# Patient Record
Sex: FEMALE | Race: WHITE | NOT HISPANIC OR LATINO | Employment: OTHER | URBAN - METROPOLITAN AREA
[De-identification: names, ages, dates, MRNs, and addresses within clinical notes are randomized per-mention and may not be internally consistent; named-entity substitution may affect disease eponyms.]

---

## 2021-12-20 ENCOUNTER — OFFICE VISIT (OUTPATIENT)
Dept: INTERNAL MEDICINE | Facility: CLINIC | Age: 66
End: 2021-12-20

## 2021-12-20 VITALS
HEIGHT: 67 IN | OXYGEN SATURATION: 99 % | BODY MASS INDEX: 28.88 KG/M2 | SYSTOLIC BLOOD PRESSURE: 112 MMHG | WEIGHT: 184 LBS | TEMPERATURE: 98.2 F | DIASTOLIC BLOOD PRESSURE: 60 MMHG | RESPIRATION RATE: 18 BRPM | HEART RATE: 69 BPM

## 2021-12-20 DIAGNOSIS — G35 MULTIPLE SCLEROSIS (HCC): ICD-10-CM

## 2021-12-20 DIAGNOSIS — E55.9 VITAMIN D DEFICIENCY: ICD-10-CM

## 2021-12-20 DIAGNOSIS — E53.8 VITAMIN B 12 DEFICIENCY: ICD-10-CM

## 2021-12-20 DIAGNOSIS — Z12.31 ENCOUNTER FOR SCREENING MAMMOGRAM FOR MALIGNANT NEOPLASM OF BREAST: ICD-10-CM

## 2021-12-20 DIAGNOSIS — Z01.89 ROUTINE LAB DRAW: ICD-10-CM

## 2021-12-20 DIAGNOSIS — R53.83 FATIGUE, UNSPECIFIED TYPE: ICD-10-CM

## 2021-12-20 DIAGNOSIS — R56.9 FOCAL SEIZURES (HCC): ICD-10-CM

## 2021-12-20 DIAGNOSIS — N95.1 POST MENOPAUSAL SYNDROME: ICD-10-CM

## 2021-12-20 DIAGNOSIS — E78.2 MIXED HYPERLIPIDEMIA: ICD-10-CM

## 2021-12-20 DIAGNOSIS — Z76.89 ENCOUNTER TO ESTABLISH CARE: Primary | ICD-10-CM

## 2021-12-20 DIAGNOSIS — D50.9 IRON DEFICIENCY ANEMIA, UNSPECIFIED IRON DEFICIENCY ANEMIA TYPE: ICD-10-CM

## 2021-12-20 PROCEDURE — 99204 OFFICE O/P NEW MOD 45 MIN: CPT

## 2021-12-20 RX ORDER — BACLOFEN 10 MG/1
10 TABLET ORAL 2 TIMES DAILY
COMMUNITY
End: 2022-03-21 | Stop reason: SDUPTHER

## 2021-12-20 RX ORDER — PRAVASTATIN SODIUM 10 MG
10 TABLET ORAL DAILY
COMMUNITY
End: 2021-12-20 | Stop reason: SDUPTHER

## 2021-12-20 RX ORDER — PRAVASTATIN SODIUM 10 MG
10 TABLET ORAL DAILY
Qty: 90 TABLET | Refills: 3 | Status: SHIPPED | OUTPATIENT
Start: 2021-12-20 | End: 2022-01-06

## 2021-12-20 RX ORDER — CHLORZOXAZONE 500 MG/1
500 TABLET ORAL
COMMUNITY
End: 2022-03-21 | Stop reason: SDUPTHER

## 2021-12-20 RX ORDER — GABAPENTIN 300 MG/1
300 CAPSULE ORAL 2 TIMES DAILY
COMMUNITY
End: 2022-03-21 | Stop reason: SDUPTHER

## 2021-12-20 RX ORDER — MULTIPLE VITAMINS W/ MINERALS TAB 9MG-400MCG
1 TAB ORAL DAILY
COMMUNITY

## 2021-12-20 RX ORDER — LANOLIN ALCOHOL/MO/W.PET/CERES
1000 CREAM (GRAM) TOPICAL DAILY
COMMUNITY

## 2021-12-20 RX ORDER — MELATONIN
2000 DAILY
COMMUNITY

## 2021-12-20 RX ORDER — CLONAZEPAM 0.5 MG/1
0.12 TABLET ORAL DAILY
COMMUNITY
End: 2022-08-09 | Stop reason: SDUPTHER

## 2021-12-20 NOTE — ASSESSMENT & PLAN NOTE
Patient has focal seizures.  She states that her neurologist diagnosed her with these.  She takes clonazepam 0.5 mg at bedtime.  She states that her symptoms are managed.  Plan: Continue visits with neurology and clonazepam.

## 2021-12-20 NOTE — ASSESSMENT & PLAN NOTE
Patient has a history of Vit D deficiency. She is taking vit D supplementation 2000 units daily.  Plan: Check vit D level. Continue vit d.

## 2021-12-20 NOTE — PROGRESS NOTES
"Chief Complaint  Establish Care (previous PCP [facility]. Fax:[phone])    Subjective          History of Present Illness  Tania Payne presents to [facility] INTER[facility] MED[facility]INE  To establish care. Last PCP [place], last appt in July.   She does have multiple sclerosis (diagnosed in 2018), hyperlipidemia, vitamin B 12 def, vit d def, focal seizures and anxiety. She reports a history of bone marrow dysfunction. She states that it has resolved. She states she had a complete workup including biopsies, all negative for malignancy. She no longer sees hematology.    She states that she just established with Dr. Miranda for management of MS. Managed by neuro.    She is active. Prior to MS, she exercised frequently, walking and going to the gym.    Hx of focal seizures. She takes clonazepam for that at bedtime. Managed by neuro.     Mammo-she states that each time she has one, she has to have follow up imaging/biopsies.  Dexa-we will order  Colon-2 years ago, benign  Pap-No longer wants to get  Flu vaccine-refused  PNA vaccine-refused  Shingles vaccine-recommended  COVID-has had both, plans to get booster.    Denies CP, SOA, palpitations, issues with bowel or blader    Objective   Vital Signs:   /60 (BP Location: Left arm, Patient Position: Sitting, Cuff Size: Large Adult)   Pulse 69   Temp 98.2 °F (36.8 °C) (Temporal)   Resp 18   Ht 170.2 cm (67\")   Wt 83.5 kg (184 lb)   SpO2 99%   BMI 28.82 kg/m²     Physical Exam  Constitutional:       Appearance: Normal appearance.   HENT:      Head: Normocephalic and atraumatic.   Eyes:      Extraocular Movements: Extraocular movements intact.      Conjunctiva/sclera: Conjunctivae normal.   Cardiovascular:      Rate and Rhythm: Normal rate and regular rhythm.      Pulses: Normal pulses.      Heart sounds: Normal heart sounds.   Pulmonary:      Effort: Pulmonary effort is normal. No respiratory distress.      Breath sounds: " Normal breath sounds. No stridor. No wheezing, rhonchi or rales.   Abdominal:      General: Bowel sounds are normal.      Palpations: Abdomen is soft.   Musculoskeletal:         General: Normal range of motion.      Cervical back: Normal range of motion.      Right lower leg: No edema.      Left lower leg: No edema.   Skin:     General: Skin is warm and dry.   Neurological:      Mental Status: She is alert and oriented to person, place, and time.   Psychiatric:         Mood and Affect: Mood normal.         Behavior: Behavior normal.         Thought Content: Thought content normal.         Judgment: Judgment normal.        Result Review :                 Assessment and Plan    Diagnoses and all orders for this visit:    1. Encounter to establish care (Primary)    2. Vitamin D deficiency  Assessment & Plan:  Patient has a history of Vit D deficiency. She is taking vit D supplementation 2000 units daily.  Plan: Check vit D level. Continue vit d.     Orders:  -     Vitamin D 25 Hydroxy; Future    3. Fatigue, unspecified type  -     TSH+Free T4; Future    4. Multiple sclerosis (HCC)  Assessment & Plan:  Patient was diagnosed multiple sclerosis in 2018.  She has already became established here in [place] with Dr. Miranda for management of that.  Neurology has been managing her medications including the gabapentin, clonazepam, Parafon, and baclofen.  Plan: Continue visits with neurology and current medication regimen.      5. Focal seizures (HCC)  Assessment & Plan:  Patient has focal seizures.  She states that her neurologist diagnosed her with these.  She takes clonazepam 0.5 mg at bedtime.  She states that her symptoms are managed.  Plan: Continue visits with neurology and clonazepam.       6. Vitamin B 12 deficiency  -     Vitamin B12 & Folate; Future    7. Mixed hyperlipidemia  Assessment & Plan:  Patient is currently taking pravastatin 10 mg qhs. Tolerates well.  Plan: Continue pravastatin and check lipid panel.      Orders:  -     CBC & Differential; Future  -     Comprehensive Metabolic Panel; Future  -     Lipid Panel; Future    8. Iron deficiency anemia, unspecified iron deficiency anemia type  Assessment & Plan:  Patient states that she has a history of bone marrow dysfunction. She did have issues with her iron levels as well. She states that those have resolved themselves. She saw hematologist and had work up in the past.  Check iron profile and cbc.    Orders:  -     Iron Profile; Future    9. Routine lab draw  -     TSH+Free T4; Future    10. Post menopausal syndrome  -     DEXA Bone Density Axial    11. Encounter for screening mammogram for malignant neoplasm of breast  -     Mammo Screening Bilateral With CAD; Future    Other orders  -     pravastatin (PRAVACHOL) 10 MG tablet; Take 1 tablet by mouth Daily.  Dispense: 90 tablet; Refill: 3      Follow Up {Instructions Charge Capture  Follow-up Communications :23}  Return for Medicare Wellness.  Patient was given instructions and counseling regarding her condition or for health maintenance advice. Please see specific information pulled into the AVS if appropriate.

## 2021-12-20 NOTE — ASSESSMENT & PLAN NOTE
Patient states that she has a history of bone marrow dysfunction. She did have issues with her iron levels as well. She states that those have resolved themselves. She saw hematologist and had work up in the past.  Check iron profile and cbc.

## 2021-12-20 NOTE — ASSESSMENT & PLAN NOTE
Patient is currently taking pravastatin 10 mg qhs. Tolerates well.  Plan: Continue pravastatin and check lipid panel.

## 2021-12-20 NOTE — ASSESSMENT & PLAN NOTE
Patient was diagnosed multiple sclerosis in 2018.  She has already became established here in [place] with Dr. Miranda for management of that.  Neurology has been managing her medications including the gabapentin, clonazepam, Parafon, and baclofen.  Plan: Continue visits with neurology and current medication regimen.

## 2021-12-22 ENCOUNTER — PATIENT ROUNDING (BHMG ONLY) (OUTPATIENT)
Dept: INTERNAL MEDICINE | Facility: CLINIC | Age: 66
End: 2021-12-22

## 2021-12-22 NOTE — PROGRESS NOTES
December 22, 2021    Hello, may I speak with Tania Payne?    My name is Loan Mcgarry       I am  with [facility] INTER[facility] MEDICINE  [address] [address].    Before we get started may I verify your date of birth? 1955    I am calling to officially welcome you to our practice and ask about your recent visit. Is this a good time to talk?had to leave voicemail    Tell me about your visit with us. What things went well? had to leave voicemail, said to call back if they have any questions or concerns       We're always looking for ways to make our patients' experiences even better. Do you have recommendations on ways we may improve?had to leave voicemail    Overall were you satisfied with your first visit to our practice?had to leave voicemail       I appreciate you taking the time to speak with me today. Is there anything else I can do for you? had to leave voicemail    Thank you, and have a great day.

## 2021-12-28 ENCOUNTER — LAB (OUTPATIENT)
Dept: LAB | Facility: HOSPITAL | Age: 66
End: 2021-12-28

## 2021-12-28 DIAGNOSIS — E53.8 VITAMIN B 12 DEFICIENCY: ICD-10-CM

## 2021-12-28 DIAGNOSIS — E55.9 VITAMIN D DEFICIENCY: ICD-10-CM

## 2021-12-28 DIAGNOSIS — D50.9 IRON DEFICIENCY ANEMIA, UNSPECIFIED IRON DEFICIENCY ANEMIA TYPE: ICD-10-CM

## 2021-12-28 DIAGNOSIS — R53.83 FATIGUE, UNSPECIFIED TYPE: ICD-10-CM

## 2021-12-28 DIAGNOSIS — E78.2 MIXED HYPERLIPIDEMIA: ICD-10-CM

## 2021-12-28 DIAGNOSIS — Z01.89 ROUTINE LAB DRAW: ICD-10-CM

## 2021-12-28 LAB
25(OH)D3 SERPL-MCNC: 34.4 NG/ML
ALBUMIN SERPL-MCNC: 4.3 G/DL (ref 3.5–5.2)
ALBUMIN/GLOB SERPL: 1.5 G/DL
ALP SERPL-CCNC: 72 U/L (ref 39–117)
ALT SERPL W P-5'-P-CCNC: 49 U/L (ref 1–33)
ANION GAP SERPL CALCULATED.3IONS-SCNC: 7.5 MMOL/L (ref 5–15)
AST SERPL-CCNC: 32 U/L (ref 1–32)
BASOPHILS # BLD AUTO: 0.02 10*3/MM3 (ref 0–0.2)
BASOPHILS NFR BLD AUTO: 0.3 % (ref 0–1.5)
BILIRUB SERPL-MCNC: 0.4 MG/DL (ref 0–1.2)
BUN SERPL-MCNC: 16 MG/DL (ref 8–23)
BUN/CREAT SERPL: 19 (ref 7–25)
CALCIUM SPEC-SCNC: 9.8 MG/DL (ref 8.6–10.5)
CHLORIDE SERPL-SCNC: 107 MMOL/L (ref 98–107)
CHOLEST SERPL-MCNC: 238 MG/DL (ref 0–200)
CO2 SERPL-SCNC: 26.5 MMOL/L (ref 22–29)
CREAT SERPL-MCNC: 0.84 MG/DL (ref 0.57–1)
DEPRECATED RDW RBC AUTO: 43.3 FL (ref 37–54)
EOSINOPHIL # BLD AUTO: 0.21 10*3/MM3 (ref 0–0.4)
EOSINOPHIL NFR BLD AUTO: 3.5 % (ref 0.3–6.2)
ERYTHROCYTE [DISTWIDTH] IN BLOOD BY AUTOMATED COUNT: 12.5 % (ref 12.3–15.4)
FOLATE SERPL-MCNC: >20 NG/ML (ref 4.78–24.2)
GFR SERPL CREATININE-BSD FRML MDRD: 68 ML/MIN/1.73
GLOBULIN UR ELPH-MCNC: 2.8 GM/DL
GLUCOSE SERPL-MCNC: 88 MG/DL (ref 65–99)
HCT VFR BLD AUTO: 42.1 % (ref 34–46.6)
HDLC SERPL-MCNC: 37 MG/DL (ref 40–60)
HGB BLD-MCNC: 14.3 G/DL (ref 12–15.9)
IMM GRANULOCYTES # BLD AUTO: 0.01 10*3/MM3 (ref 0–0.05)
IMM GRANULOCYTES NFR BLD AUTO: 0.2 % (ref 0–0.5)
IRON 24H UR-MRATE: 127 MCG/DL (ref 37–145)
IRON SATN MFR SERPL: 41 % (ref 20–50)
LDLC SERPL CALC-MCNC: 140 MG/DL (ref 0–100)
LDLC/HDLC SERPL: 3.64 {RATIO}
LYMPHOCYTES # BLD AUTO: 2.46 10*3/MM3 (ref 0.7–3.1)
LYMPHOCYTES NFR BLD AUTO: 41.3 % (ref 19.6–45.3)
MCH RBC QN AUTO: 32 PG (ref 26.6–33)
MCHC RBC AUTO-ENTMCNC: 34 G/DL (ref 31.5–35.7)
MCV RBC AUTO: 94.2 FL (ref 79–97)
MONOCYTES # BLD AUTO: 0.46 10*3/MM3 (ref 0.1–0.9)
MONOCYTES NFR BLD AUTO: 7.7 % (ref 5–12)
NEUTROPHILS NFR BLD AUTO: 2.8 10*3/MM3 (ref 1.7–7)
NEUTROPHILS NFR BLD AUTO: 47 % (ref 42.7–76)
PLATELET # BLD AUTO: 222 10*3/MM3 (ref 140–450)
PMV BLD AUTO: 8.8 FL (ref 6–12)
POTASSIUM SERPL-SCNC: 4.2 MMOL/L (ref 3.5–5.2)
PROT SERPL-MCNC: 7.1 G/DL (ref 6–8.5)
RBC # BLD AUTO: 4.47 10*6/MM3 (ref 3.77–5.28)
SODIUM SERPL-SCNC: 141 MMOL/L (ref 136–145)
T4 FREE SERPL-MCNC: 0.81 NG/DL (ref 0.93–1.7)
TIBC SERPL-MCNC: 310 MCG/DL (ref 298–536)
TRANSFERRIN SERPL-MCNC: 208 MG/DL (ref 200–360)
TRIGL SERPL-MCNC: 332 MG/DL (ref 0–150)
TSH SERPL DL<=0.05 MIU/L-ACNC: 3.46 UIU/ML (ref 0.27–4.2)
VIT B12 BLD-MCNC: 629 PG/ML (ref 211–946)
VLDLC SERPL-MCNC: 61 MG/DL (ref 5–40)
WBC NRBC COR # BLD: 5.96 10*3/MM3 (ref 3.4–10.8)

## 2021-12-28 PROCEDURE — 83540 ASSAY OF IRON: CPT

## 2021-12-28 PROCEDURE — 80053 COMPREHEN METABOLIC PANEL: CPT

## 2021-12-28 PROCEDURE — 82306 VITAMIN D 25 HYDROXY: CPT

## 2021-12-28 PROCEDURE — 84439 ASSAY OF FREE THYROXINE: CPT

## 2021-12-28 PROCEDURE — 85025 COMPLETE CBC W/AUTO DIFF WBC: CPT

## 2021-12-28 PROCEDURE — 82607 VITAMIN B-12: CPT

## 2021-12-28 PROCEDURE — 36415 COLL VENOUS BLD VENIPUNCTURE: CPT

## 2021-12-28 PROCEDURE — 84466 ASSAY OF TRANSFERRIN: CPT

## 2021-12-28 PROCEDURE — 80061 LIPID PANEL: CPT

## 2021-12-28 PROCEDURE — 84443 ASSAY THYROID STIM HORMONE: CPT

## 2021-12-28 PROCEDURE — 82746 ASSAY OF FOLIC ACID SERUM: CPT

## 2022-01-06 DIAGNOSIS — E78.2 MIXED HYPERLIPIDEMIA: Primary | ICD-10-CM

## 2022-01-06 DIAGNOSIS — E55.9 VITAMIN D DEFICIENCY: ICD-10-CM

## 2022-01-06 RX ORDER — PRAVASTATIN SODIUM 20 MG
20 TABLET ORAL DAILY
Qty: 90 TABLET | Refills: 1 | Status: SHIPPED | OUTPATIENT
Start: 2022-01-06 | End: 2022-07-06 | Stop reason: SDUPTHER

## 2022-01-12 ENCOUNTER — TELEPHONE (OUTPATIENT)
Dept: INTERNAL MEDICINE | Facility: CLINIC | Age: 67
End: 2022-01-12

## 2022-01-12 DIAGNOSIS — G35 MULTIPLE SCLEROSIS: Primary | ICD-10-CM

## 2022-01-12 DIAGNOSIS — R56.9 FOCAL SEIZURES: ICD-10-CM

## 2022-01-12 NOTE — TELEPHONE ENCOUNTER
Called patient to let her know of her husbands positive COVID test results. Patient  is to stop taking prednisone and start new medication that Shanta has sent in to pharmacy. Pharmacy called to say that they do not have 6 mg tabs but they do have 4 mg tabs and so he will need to take 1.5 tabs a day to make 6 mg. Total of 15 tabs.    Also wanted to know how patient  was feeling? Has he gotten any better? Is she experiencing any symptoms? We have put in an order for her to get a COVID test done because she is also a patient of Shanta Hannah. Patient can go to the same place her  got tested.

## 2022-01-12 NOTE — TELEPHONE ENCOUNTER
Patient is requesting referral to neurology. I have pended order if you would like to sign. Or if you cant from home this is one I can sign with verbal.    Wanting referral for her multiple sclerosis and focal seizures. Let me know and I will respond to her [employer] message.

## 2022-02-08 ENCOUNTER — HOSPITAL ENCOUNTER (OUTPATIENT)
Dept: MAMMOGRAPHY | Facility: HOSPITAL | Age: 67
Discharge: HOME | End: 2022-02-08

## 2022-02-08 ENCOUNTER — HOSPITAL ENCOUNTER (OUTPATIENT)
Dept: BONE DENSITY | Facility: HOSPITAL | Age: 67
Discharge: HOME | End: 2022-02-08

## 2022-02-08 DIAGNOSIS — Z12.31 ENCOUNTER FOR SCREENING MAMMOGRAM FOR MALIGNANT NEOPLASM OF BREAST: ICD-10-CM

## 2022-02-08 PROCEDURE — 77080 DXA BONE DENSITY AXIAL: CPT

## 2022-02-08 PROCEDURE — 77063 BREAST TOMOSYNTHESIS BI: CPT

## 2022-02-08 PROCEDURE — 77067 SCR MAMMO BI INCL CAD: CPT

## 2022-02-22 DIAGNOSIS — N60.01 BREAST CYST, RIGHT: ICD-10-CM

## 2022-02-22 DIAGNOSIS — N63.0 BREAST NODULE: ICD-10-CM

## 2022-02-22 DIAGNOSIS — R92.8 ABNORMAL MAMMOGRAM: Primary | ICD-10-CM

## 2022-03-01 ENCOUNTER — OFFICE VISIT (OUTPATIENT)
Dept: NEUROLOGY | Facility: CLINIC | Age: 67
End: 2022-03-01

## 2022-03-01 VITALS
HEART RATE: 70 BPM | SYSTOLIC BLOOD PRESSURE: 129 MMHG | BODY MASS INDEX: 29.03 KG/M2 | WEIGHT: 185 LBS | DIASTOLIC BLOOD PRESSURE: 71 MMHG | HEIGHT: 67 IN

## 2022-03-01 DIAGNOSIS — G51.32 HEMIFACIAL SPASM OF LEFT SIDE OF FACE: ICD-10-CM

## 2022-03-01 DIAGNOSIS — G93.9 CHRONIC NON-SPECIFIC WHITE MATTER LESIONS ON MRI: ICD-10-CM

## 2022-03-01 DIAGNOSIS — G89.4 CHRONIC PAIN SYNDROME: Primary | ICD-10-CM

## 2022-03-01 DIAGNOSIS — G43.009 MIGRAINE WITHOUT AURA AND WITHOUT STATUS MIGRAINOSUS, NOT INTRACTABLE: ICD-10-CM

## 2022-03-01 PROCEDURE — 99205 OFFICE O/P NEW HI 60 MIN: CPT | Performed by: PSYCHIATRY & NEUROLOGY

## 2022-03-01 NOTE — ASSESSMENT & PLAN NOTE
Her main complaint is diffuse pain.  She also has fatigue.  I discussed with her that I do not think she has multiple sclerosis.  She does not have the clinical symptomatology and MRI findings suggestive of relapsing-remitting multiple sclerosis.  Lumbar puncture was negative for oligoclonal bands.    I discussed with her that I can initiate a work-up however I may not be able to give her a diagnosis.  I would recommend for her to go to the [facility] to get an opinion since she has seen 3 neurologists.    I discussed with her that the [facility] may do a rheumatologic work-up, neurologic work-up and may give her a diagnosis of fibromyalgia syndrome.  She states that she does not like that diagnosis.  I discussed with her that leaving up to the [facility] to give her a specific diagnosis.    I would recommend for her primary care provider to prescribe to her clonazepam, gabapentin, baclofen until she has been seen by the [facility].  She states that it controls her symptoms.

## 2022-03-01 NOTE — ASSESSMENT & PLAN NOTE
I discussed with her that she probably is hemifacial spasms in the left side of her face which the treatment for that is Botox injections.  I discussed with her that clonazepam is not usually effective for this.

## 2022-03-01 NOTE — ASSESSMENT & PLAN NOTE
She has a history of migraine headaches and I believe the white matter changes seen on MRI are secondary to migraine headaches and not multiple sclerosis.

## 2022-03-01 NOTE — ASSESSMENT & PLAN NOTE
MRI findings are unlikely secondary to vasculitis.  It is most likely related to migraine headaches.

## 2022-03-01 NOTE — PROGRESS NOTES
Chief Complaint  Multiple Sclerosis    Subjective          Tania Payne is a 66 y.o. female who presents to [facility] NEUROLOGY & NEUROSURGERY  History of Present Illness  66-year-old woman evaluated for 20 to 25-year history of pain, multiple sclerosis, facial spasms.  She was seen by a doctor in [place] and was diagnosed to have multiple sclerosis.  She took Tysabri which gave her bone marrow suppression.  She went to [facility] and they could not tell her why she had bone marrow suppression.  It has resolved.  She states that 20 to 25 years ago she had flexor spasms in her right shoulder, right knee, back and all over her body.  She states that 2 weeks in 2016 she felt weak all over in her arms and legs that lasted for 1 month.  She was 50% affected.  Her speech was okay.  She had difficulty with cognitive issues since that time.  She states that it is hard for her to think at times.  She states that she confuses her recipes.  She has to go back and look over again since her short-term memory is affected.  She is independent with all activities of daily living.  She has no problems with getting lost, driving, going to the grocery stores, financial problems.  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  She is a physical therapist.    She states that her sleep is fine.  She wakes up feeling refreshed.  She does not have any excessive daytime somnolence.  She states that she is not depressed.  There is no history of depression.    She has spasms in the left side of her face occurring 2 times a month lasting for 2 to 3 days.  It happens several times a day of her left eye predominantly having spasms as well as her left face and sometimes her mouth.  This is been ongoing for the last 2 to 3 years.  She states that her previous neurologist diagnosed her to have seizures and treated her for  "such.    She was diagnosed to have multiple sclerosis as well based on white matter changes in the MRI.  The last time she had a lumbar puncture in 2019 oligoclonal bands were negative.  I reviewed the MRI of the brain and these are nonspecific white matter changes.  They are not periventricular in location and does not meet the Cavazos's criteria.  There are no enhancing lesions.  She has had several MRIs of the brain which are unchanged.  She has never had any focal neurologic deficits.  She is never had strokelike symptoms.  She has a history of migraine headaches since she was 19 years old.  She would have the headaches at least 1-2 times a month lasting for 2 to 3 days where she had a severe headache associated light noise sensitivity, nausea.  She states that the migraine started getting better in menopause when she was about 57 years old.  She still getting headaches occasionally but not as severe as she is to have in the past.    Objective   Vital Signs:   /71   Pulse 70   Ht 170.2 cm (67.01\")   Wt 83.9 kg (185 lb)   BMI 28.97 kg/m²     Physical Exam   She is alert, fluent, phasic, follows commands well.  Optic disc are normal bilaterally visual fields full confrontation, EOMs are full all directions gaze, facial strength is full, soft palate elevation and tongue are normal.  There is no weakness of the upper or lower extremities individual muscle testing.  Fine finger movements are intact.  Reflexes are normoactive symmetrical in biceps, triceps, patellar's and ankles.  Cerebellar testing is intact finger-to-nose, rapid movements and fine finger movements.  Toe tapping is intact.  Station gait she is able to tiptoe, heel walk, son and tandem without difficulty.  Heart is regular and rhythm normal in rate.        Assessment and Plan  Diagnoses and all orders for this visit:    1. Chronic pain syndrome (Primary)  Assessment & Plan:  Her main complaint is diffuse pain.  She also has fatigue.  I " discussed with her that I do not think she has multiple sclerosis.  She does not have the clinical symptomatology and MRI findings suggestive of relapsing-remitting multiple sclerosis.  Lumbar puncture was negative for oligoclonal bands.    I discussed with her that I can initiate a work-up however I may not be able to give her a diagnosis.  I would recommend for her to go to the [facility] to get an opinion since she has seen 3 neurologists.    I discussed with her that the [facility] may do a rheumatologic work-up, neurologic work-up and may give her a diagnosis of fibromyalgia syndrome.  She states that she does not like that diagnosis.  I discussed with her that leaving up to the [facility] to give her a specific diagnosis.    I would recommend for her primary care provider to prescribe to her clonazepam, gabapentin, baclofen until she has been seen by the [facility].  She states that it controls her symptoms.        2. 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  I discussed with her that clonazepam is not usually effective for this.      3. Migraine without aura and without status migrainosus, not intractable  Assessment & Plan:  She has a history of migraine headaches and I believe the white matter changes seen on MRI are secondary to migraine headaches and not multiple sclerosis.        4. Chronic non-specific white matter lesions on MRI  Assessment & Plan:  MRI findings are unlikely secondary to vasculitis.  It is most likely related to migraine headaches.         Total time spent with the patient and coordinating patient care was 60 minutes.    Follow Up  No follow-ups on file.  Patient was given instructions and counseling regarding her condition or for health maintenance advice. Please see specific information pulled into the AVS if appropriate.

## 2022-03-04 ENCOUNTER — PATIENT ROUNDING (BHMG ONLY) (OUTPATIENT)
Dept: NEUROSURGERY | Facility: CLINIC | Age: 67
End: 2022-03-04

## 2022-03-04 NOTE — PROGRESS NOTES
Ld, My name is Lisa     I am  The Practice Manager with [facility] NEUROSURGERY [facility] NEUROLOGY & NEUROSURGERY and want to officially welcome you to our practice and ask about your recent visit.    Tell me about your visit with us. What things went well?         We're always looking for ways to make our patients' experiences even better. Do you have recommendations on ways we may improve?      Overall were you satisfied with your first visit to our practice?      I appreciate you taking the time to answer these question. Is there anything else I can do for you?     You may also receive a brief survey via e-mail or mail from [employer] about our patient satisfaction, if you could please take a moment and answer it would be helpful to the practice growth.    Thank you, and have a great day.

## 2022-03-21 ENCOUNTER — HOSPITAL ENCOUNTER (OUTPATIENT)
Dept: MAMMOGRAPHY | Facility: HOSPITAL | Age: 67
Discharge: HOME | End: 2022-03-21

## 2022-03-21 ENCOUNTER — HOSPITAL ENCOUNTER (OUTPATIENT)
Dept: ULTRASOUND IMAGING | Facility: HOSPITAL | Age: 67
Discharge: HOME | End: 2022-03-21

## 2022-03-21 ENCOUNTER — OFFICE VISIT (OUTPATIENT)
Dept: INTERNAL MEDICINE | Facility: CLINIC | Age: 67
End: 2022-03-21

## 2022-03-21 VITALS
BODY MASS INDEX: 29.41 KG/M2 | WEIGHT: 187.4 LBS | HEART RATE: 81 BPM | DIASTOLIC BLOOD PRESSURE: 72 MMHG | SYSTOLIC BLOOD PRESSURE: 110 MMHG | OXYGEN SATURATION: 95 % | TEMPERATURE: 97.8 F | HEIGHT: 67 IN

## 2022-03-21 DIAGNOSIS — G89.4 CHRONIC PAIN SYNDROME: ICD-10-CM

## 2022-03-21 DIAGNOSIS — G35 MULTIPLE SCLEROSIS: Primary | ICD-10-CM

## 2022-03-21 DIAGNOSIS — G93.9 CHRONIC NON-SPECIFIC WHITE MATTER LESIONS ON MRI: ICD-10-CM

## 2022-03-21 DIAGNOSIS — R56.9 FOCAL SEIZURES: ICD-10-CM

## 2022-03-21 PROCEDURE — 77066 DX MAMMO INCL CAD BI: CPT

## 2022-03-21 PROCEDURE — G0279 TOMOSYNTHESIS, MAMMO: HCPCS

## 2022-03-21 PROCEDURE — 99213 OFFICE O/P EST LOW 20 MIN: CPT

## 2022-03-21 PROCEDURE — 76642 ULTRASOUND BREAST LIMITED: CPT

## 2022-03-21 RX ORDER — BACLOFEN 10 MG/1
10 TABLET ORAL 2 TIMES DAILY
Qty: 180 TABLET | Refills: 0 | Status: SHIPPED | OUTPATIENT
Start: 2022-03-21 | End: 2022-06-19 | Stop reason: SDUPTHER

## 2022-03-21 RX ORDER — CLONAZEPAM 0.5 MG/1
0.12 TABLET ORAL DAILY
Qty: 90 TABLET | Refills: 0 | Status: CANCELLED | OUTPATIENT
Start: 2022-03-21

## 2022-03-21 RX ORDER — CHLORZOXAZONE 500 MG/1
500 TABLET ORAL
Qty: 180 TABLET | Refills: 0 | Status: SHIPPED | OUTPATIENT
Start: 2022-03-21 | End: 2022-10-03 | Stop reason: SDUPTHER

## 2022-03-21 NOTE — ASSESSMENT & PLAN NOTE
Patient has been diagnosed with multiple sclerosis years ago.  She recently was seen by Dr. Zhao yet who does not agree that she has MS.  He feels that she needs to go to the [facility] for definitive diagnosis.  Patient is currently on gabapentin, baclofen clonazepam, Parafon and all of those manage her symptoms.  Patient does occasionally have pain, weakness, off balance and confusion episodes.  She does have an appointment tomorrow with the [facility].  Plan: Consult with [facility] tomorrow.  Continue current regimen for now unless otherwise recommended by [facility].

## 2022-03-21 NOTE — PROGRESS NOTES
"Chief Complaint  Med Refill (Gabapentin, baclofen, clonazpam, chlorazoxazone.//Neurology Radha wanted patient to go to [facility] to get a MS Dx because he didn't agree with DX.  believes medication is helping symptoms but wants you to manage them until [place] clinic is done.)    Subjective          History of Present Illness  Tania Payne presents to [facility] INTERNAL MEDICINE  To follow up on recent visit with neurology.  She had been diagnosed with MS years ago.  She was told that she did not have MS by Dr. Garcia so to get a definitive diagnosis, he feels that she should go to the [facility].   She feels like her current medication regimen is working for her.  She has already been to rheumatologists.  She has episodes of weakness, off balance, and confusion.     She has a phone appt with [facility] tomorrow.     Objective   Vital Signs:   /72 (BP Location: Right arm, Patient Position: Sitting, Cuff Size: Adult)   Pulse 81   Temp 97.8 °F (36.6 °C) (Temporal)   Ht 170.2 cm (67.01\")   Wt 85 kg (187 lb 6.4 oz)   SpO2 95%   BMI 29.34 kg/m²     Physical Exam  Constitutional:       Appearance: Normal appearance.   Eyes:      Extraocular Movements: Extraocular movements intact.      Conjunctiva/sclera: Conjunctivae normal.   Cardiovascular:      Rate and Rhythm: Normal rate.      Pulses: Normal pulses.   Pulmonary:      Effort: Pulmonary effort is normal.   Abdominal:      General: Bowel sounds are normal. There is no distension.      Palpations: Abdomen is soft. There is no mass.      Tenderness: There is no abdominal tenderness. There is no right CVA tenderness, left CVA tenderness, guarding or rebound.      Hernia: No hernia is present.   Musculoskeletal:         General: Normal range of motion.      Cervical back: Normal range of motion.   Skin:     General: Skin is warm and dry.   Neurological:      Mental Status: She is alert and oriented to person, place, and time. "   Psychiatric:         Mood and Affect: Mood normal.         Behavior: Behavior normal.         Thought Content: Thought content normal.         Judgment: Judgment normal.        Result Review :                 Assessment and Plan    Diagnoses and all orders for this visit:    1. Multiple sclerosis (HCC) (Primary)  Assessment & Plan:  Patient has been diagnosed with multiple sclerosis years ago.  She recently was seen by Dr. Zhao yet who does not agree that she has MS.  He feels that she needs to go to the [facility] for definitive diagnosis.  Patient is currently on gabapentin, baclofen clonazepam, Parafon and all of those manage her symptoms.  Patient does occasionally have pain, weakness, off balance and confusion episodes.  She does have an appointment tomorrow with the [facility].  Plan: Consult with [facility] tomorrow.  Continue current regimen for now unless otherwise recommended by [facility].    Orders:  -     baclofen (LIORESAL) 10 MG tablet; Take 1 tablet by mouth 2 (Two) Times a Day.  Dispense: 180 tablet; Refill: 0  -     gabapentin (NEURONTIN) 300 MG capsule; Take 1 capsule by mouth 2 (Two) Times a Day.  Dispense: 180 capsule; Refill: 0  -     chlorzoxazone (PARAFON FORTE) 500 MG tablet; Take 1/2 tablet by mouth in the afternoon and 1 tablet at bedtime.  Dispense: 180 tablet; Refill: 0    2. Chronic non-specific white matter lesions on MRI    3. Chronic pain syndrome  -     Urine Drug Screen - Urine, Clean Catch; Future    4. Focal seizures (HCC)  Assessment & Plan:  Managed with clonazepam 0.125 mg daily.  Plan: Continue with clonazepam 0.125. See what [facility] says during consultation.      Orders:  -     clonazePAM (KlonoPIN) 0.125 MG disintegrating tablet; Place 1 tablet on the tongue Daily.  Dispense: 90 tablet; Refill: 0      Follow Up   No follow-ups on file.  Patient was given instructions and counseling regarding her condition or for health maintenance advice. Please see specific  information pulled into the AVS if appropriate.       Answers for HPI/ROS submitted by the patient on 3/15/2022  Please describe your symptoms.: Pain in both hip flexors, both arms and R leg.  Have you had these symptoms before?: Yes  How long have you been having these symptoms?: Greater than 2 weeks  Please list any medications you are currently taking for this condition.: Baclofen, gabapentin, chlorzoxazone.  Please describe any probable cause for these symptoms. : Multiple sclerosis  What is the primary reason for your visit?: Other

## 2022-03-21 NOTE — ASSESSMENT & PLAN NOTE
Managed with clonazepam 0.125 mg daily.  Plan: Continue with clonazepam 0.125. See what [facility] says during consultation.

## 2022-03-25 DIAGNOSIS — G35 MULTIPLE SCLEROSIS: ICD-10-CM

## 2022-03-25 DIAGNOSIS — G89.4 CHRONIC PAIN SYNDROME: Primary | ICD-10-CM

## 2022-03-25 RX ORDER — CLONAZEPAM 0.12 MG/1
0.12 TABLET, ORALLY DISINTEGRATING ORAL DAILY
Qty: 90 TABLET | Refills: 0 | Status: SHIPPED | OUTPATIENT
Start: 2022-03-25 | End: 2022-11-08 | Stop reason: SDUPTHER

## 2022-03-25 RX ORDER — GABAPENTIN 300 MG/1
300 CAPSULE ORAL 2 TIMES DAILY
Qty: 180 CAPSULE | Refills: 0 | Status: SHIPPED | OUTPATIENT
Start: 2022-03-25 | End: 2022-06-19 | Stop reason: SDUPTHER

## 2022-06-08 ENCOUNTER — TELEPHONE (OUTPATIENT)
Dept: INTERNAL MEDICINE | Facility: CLINIC | Age: 67
End: 2022-06-08

## 2022-06-08 NOTE — TELEPHONE ENCOUNTER
Camilla, Her referral to Dr. Jose Pederson has been denied, they state that they do not do second opinions. I didn't know what you thought Shanta would want to do about this. Thanks.

## 2022-06-08 NOTE — TELEPHONE ENCOUNTER
Patient is aware. She is going to see if she can find another office that does theses second opinions. Then let us know. Closing referral.

## 2022-06-19 DIAGNOSIS — G35 MULTIPLE SCLEROSIS: ICD-10-CM

## 2022-06-20 RX ORDER — GABAPENTIN 300 MG/1
300 CAPSULE ORAL 2 TIMES DAILY
Qty: 180 CAPSULE | Refills: 0 | Status: SHIPPED | OUTPATIENT
Start: 2022-06-20 | End: 2022-09-18 | Stop reason: SDUPTHER

## 2022-06-20 RX ORDER — BACLOFEN 10 MG/1
10 TABLET ORAL 2 TIMES DAILY
Qty: 180 TABLET | Refills: 0 | Status: SHIPPED | OUTPATIENT
Start: 2022-06-20 | End: 2022-10-04

## 2022-07-06 RX ORDER — PRAVASTATIN SODIUM 20 MG
20 TABLET ORAL DAILY
Qty: 90 TABLET | Refills: 1 | Status: SHIPPED | OUTPATIENT
Start: 2022-07-06 | End: 2022-08-09 | Stop reason: SDUPTHER

## 2022-08-08 ENCOUNTER — LAB (OUTPATIENT)
Dept: LAB | Facility: HOSPITAL | Age: 67
End: 2022-08-08

## 2022-08-08 DIAGNOSIS — E55.9 VITAMIN D DEFICIENCY: ICD-10-CM

## 2022-08-08 DIAGNOSIS — E78.2 MIXED HYPERLIPIDEMIA: ICD-10-CM

## 2022-08-08 DIAGNOSIS — G89.4 CHRONIC PAIN SYNDROME: ICD-10-CM

## 2022-08-08 LAB
25(OH)D3 SERPL-MCNC: 34.3 NG/ML (ref 30–100)
ALBUMIN SERPL-MCNC: 4.4 G/DL (ref 3.5–5.2)
ALBUMIN/GLOB SERPL: 1.7 G/DL
ALP SERPL-CCNC: 69 U/L (ref 39–117)
ALT SERPL W P-5'-P-CCNC: 43 U/L (ref 1–33)
AMPHET+METHAMPHET UR QL: NEGATIVE
ANION GAP SERPL CALCULATED.3IONS-SCNC: 12.6 MMOL/L (ref 5–15)
AST SERPL-CCNC: 31 U/L (ref 1–32)
BACTERIA UR QL AUTO: NORMAL /HPF
BARBITURATES UR QL SCN: NEGATIVE
BASOPHILS # BLD AUTO: 0.02 10*3/MM3 (ref 0–0.2)
BASOPHILS NFR BLD AUTO: 0.4 % (ref 0–1.5)
BENZODIAZ UR QL SCN: NEGATIVE
BILIRUB SERPL-MCNC: 0.3 MG/DL (ref 0–1.2)
BILIRUB UR QL STRIP: NEGATIVE
BUN SERPL-MCNC: 12 MG/DL (ref 8–23)
BUN/CREAT SERPL: 14.1 (ref 7–25)
CALCIUM SPEC-SCNC: 9 MG/DL (ref 8.6–10.5)
CANNABINOIDS SERPL QL: NEGATIVE
CHLORIDE SERPL-SCNC: 105 MMOL/L (ref 98–107)
CHOLEST SERPL-MCNC: 212 MG/DL (ref 0–200)
CLARITY UR: CLEAR
CO2 SERPL-SCNC: 23.4 MMOL/L (ref 22–29)
COCAINE UR QL: NEGATIVE
COLOR UR: YELLOW
CREAT SERPL-MCNC: 0.85 MG/DL (ref 0.57–1)
DEPRECATED RDW RBC AUTO: 43.1 FL (ref 37–54)
EGFRCR SERPLBLD CKD-EPI 2021: 75.7 ML/MIN/1.73
EOSINOPHIL # BLD AUTO: 0.21 10*3/MM3 (ref 0–0.4)
EOSINOPHIL NFR BLD AUTO: 4 % (ref 0.3–6.2)
ERYTHROCYTE [DISTWIDTH] IN BLOOD BY AUTOMATED COUNT: 12.3 % (ref 12.3–15.4)
GLOBULIN UR ELPH-MCNC: 2.6 GM/DL
GLUCOSE SERPL-MCNC: 91 MG/DL (ref 65–99)
GLUCOSE UR STRIP-MCNC: NEGATIVE MG/DL
HCT VFR BLD AUTO: 42.4 % (ref 34–46.6)
HDLC SERPL-MCNC: 36 MG/DL (ref 40–60)
HGB BLD-MCNC: 14 G/DL (ref 12–15.9)
HGB UR QL STRIP.AUTO: ABNORMAL
IMM GRANULOCYTES # BLD AUTO: 0 10*3/MM3 (ref 0–0.05)
IMM GRANULOCYTES NFR BLD AUTO: 0 % (ref 0–0.5)
KETONES UR QL STRIP: NEGATIVE
LDLC SERPL CALC-MCNC: 119 MG/DL (ref 0–100)
LDLC/HDLC SERPL: 3.09 {RATIO}
LEUKOCYTE ESTERASE UR QL STRIP.AUTO: NEGATIVE
LYMPHOCYTES # BLD AUTO: 1.99 10*3/MM3 (ref 0.7–3.1)
LYMPHOCYTES NFR BLD AUTO: 37.7 % (ref 19.6–45.3)
MCH RBC QN AUTO: 30.8 PG (ref 26.6–33)
MCHC RBC AUTO-ENTMCNC: 33 G/DL (ref 31.5–35.7)
MCV RBC AUTO: 93.4 FL (ref 79–97)
METHADONE UR QL SCN: NEGATIVE
MONOCYTES # BLD AUTO: 0.41 10*3/MM3 (ref 0.1–0.9)
MONOCYTES NFR BLD AUTO: 7.8 % (ref 5–12)
NEUTROPHILS NFR BLD AUTO: 2.65 10*3/MM3 (ref 1.7–7)
NEUTROPHILS NFR BLD AUTO: 50.1 % (ref 42.7–76)
NITRITE UR QL STRIP: NEGATIVE
OPIATES UR QL: NEGATIVE
OXYCODONE UR QL SCN: NEGATIVE
PH UR STRIP.AUTO: 5.5 [PH] (ref 5–8)
PLATELET # BLD AUTO: 205 10*3/MM3 (ref 140–450)
PMV BLD AUTO: 8.7 FL (ref 6–12)
POTASSIUM SERPL-SCNC: 4.3 MMOL/L (ref 3.5–5.2)
PROT SERPL-MCNC: 7 G/DL (ref 6–8.5)
PROT UR QL STRIP: NEGATIVE
RBC # BLD AUTO: 4.54 10*6/MM3 (ref 3.77–5.28)
RBC # UR STRIP: NORMAL /HPF
REF LAB TEST METHOD: NORMAL
SODIUM SERPL-SCNC: 141 MMOL/L (ref 136–145)
SP GR UR STRIP: 1.02 (ref 1–1.03)
SQUAMOUS #/AREA URNS HPF: NORMAL /HPF
T4 FREE SERPL-MCNC: 0.8 NG/DL (ref 0.93–1.7)
TRIGL SERPL-MCNC: 323 MG/DL (ref 0–150)
TSH SERPL DL<=0.05 MIU/L-ACNC: 3.55 UIU/ML (ref 0.27–4.2)
UROBILINOGEN UR QL STRIP: ABNORMAL
VLDLC SERPL-MCNC: 57 MG/DL (ref 5–40)
WBC # UR STRIP: NORMAL /HPF
WBC NRBC COR # BLD: 5.28 10*3/MM3 (ref 3.4–10.8)

## 2022-08-08 PROCEDURE — 82306 VITAMIN D 25 HYDROXY: CPT

## 2022-08-08 PROCEDURE — 80061 LIPID PANEL: CPT

## 2022-08-08 PROCEDURE — 80307 DRUG TEST PRSMV CHEM ANLYZR: CPT

## 2022-08-08 PROCEDURE — 84443 ASSAY THYROID STIM HORMONE: CPT

## 2022-08-08 PROCEDURE — 81001 URINALYSIS AUTO W/SCOPE: CPT

## 2022-08-08 PROCEDURE — 80053 COMPREHEN METABOLIC PANEL: CPT

## 2022-08-08 PROCEDURE — 36415 COLL VENOUS BLD VENIPUNCTURE: CPT

## 2022-08-08 PROCEDURE — 85025 COMPLETE CBC W/AUTO DIFF WBC: CPT

## 2022-08-08 PROCEDURE — 84439 ASSAY OF FREE THYROXINE: CPT

## 2022-08-09 ENCOUNTER — OFFICE VISIT (OUTPATIENT)
Dept: INTERNAL MEDICINE | Facility: CLINIC | Age: 67
End: 2022-08-09

## 2022-08-09 VITALS
WEIGHT: 190.8 LBS | TEMPERATURE: 98.2 F | HEIGHT: 67 IN | HEART RATE: 73 BPM | DIASTOLIC BLOOD PRESSURE: 72 MMHG | OXYGEN SATURATION: 97 % | SYSTOLIC BLOOD PRESSURE: 108 MMHG | BODY MASS INDEX: 29.95 KG/M2 | RESPIRATION RATE: 20 BRPM

## 2022-08-09 DIAGNOSIS — E78.2 MIXED HYPERLIPIDEMIA: ICD-10-CM

## 2022-08-09 DIAGNOSIS — E53.8 VITAMIN B 12 DEFICIENCY: ICD-10-CM

## 2022-08-09 DIAGNOSIS — G35 MULTIPLE SCLEROSIS: ICD-10-CM

## 2022-08-09 DIAGNOSIS — G89.4 CHRONIC PAIN SYNDROME: ICD-10-CM

## 2022-08-09 DIAGNOSIS — R56.9 FOCAL SEIZURES: ICD-10-CM

## 2022-08-09 DIAGNOSIS — R31.9 HEMATURIA, UNSPECIFIED TYPE: ICD-10-CM

## 2022-08-09 DIAGNOSIS — E55.9 VITAMIN D DEFICIENCY: ICD-10-CM

## 2022-08-09 PROCEDURE — 99214 OFFICE O/P EST MOD 30 MIN: CPT

## 2022-08-09 RX ORDER — PRAVASTATIN SODIUM 40 MG
40 TABLET ORAL DAILY
Qty: 90 TABLET | Refills: 1 | Status: SHIPPED | OUTPATIENT
Start: 2022-08-09 | End: 2023-02-01 | Stop reason: SDUPTHER

## 2022-08-09 NOTE — ASSESSMENT & PLAN NOTE
Denies a history of focal seizures.  He takes clonazepam 0.125 mg about 3 times a week.  No seizures.  Plan: Continue current regimen.

## 2022-08-09 NOTE — ASSESSMENT & PLAN NOTE
Patient has multiple sclerosis.  She was diagnosed several years ago.  She does have chronic pain.  She would like a referral to a new MS clinic.  She is taking baclofen in the mornings and gabapentin at night for pain control.  She feels like it is tolerable until she can get in with the MS clinic.  Plan: Continue current regimen.  Referral to MS clinic.

## 2022-08-09 NOTE — ASSESSMENT & PLAN NOTE
Cholesterol triglycerides are still elevated.  She is tolerating pravastatin 20 mg daily.  Plan: Increase pravastatin to 40 mg daily.

## 2022-08-09 NOTE — PROGRESS NOTES
Chief Complaint  Labs Only (Patient is here for a 4 month follow up with labs.) and Referral (Patient needed referral to MS clinic.) Answers for HPI/ROS submitted by the patient on 8/2/2022  What is the primary reason for your visit?: Neurological Problem    SUBJECTIVE  Tania Payne presents to [facility] follow up on MS, hyperlipidemia, vit d deficiency, seizures.       Patient denies any new issues or complaints.  Denies any chest pain, shortness of breath, palpitations, problems with bowel or bladder.      History of Present Illness  Past Medical History:   Diagnosis Date   • Multiple sclerosis (HCC)       Family History   Problem Relation Age of Onset   • Pancreatitis Mother    • Heart disease Father       Past Surgical History:   Procedure Laterality Date   • KNEE ARTHROSCOPY Right 2018   • SHOULDER ARTHROSCOPY Right 1998        Current Outpatient Medications:   •  baclofen (LIORESAL) 10 MG tablet, Take 1 tablet by mouth 2 (Two) Times a Day., Disp: 180 tablet, Rfl: 0  •  chlorzoxazone (PARAFON FORTE) 500 MG tablet, Take 1/2 tablet by mouth in the afternoon and 1 tablet at bedtime., Disp: 180 tablet, Rfl: 0  •  cholecalciferol (VITAMIN D3) 25 MCG (1000 UT) tablet, Take 2,000 Units by mouth Daily., Disp: , Rfl:   •  clonazePAM (KlonoPIN) 0.125 MG disintegrating tablet, Place 1 tablet on the tongue and dissolve daily., Disp: 90 tablet, Rfl: 0  •  gabapentin (NEURONTIN) 300 MG capsule, Take 1 capsule by mouth 2 (Two) Times a Day., Disp: 180 capsule, Rfl: 0  •  multivitamin with minerals tablet tablet, Take 1 tablet by mouth Daily., Disp: , Rfl:   •  pravastatin (PRAVACHOL) 40 MG tablet, Take 1 tablet by mouth Daily., Disp: 90 tablet, Rfl: 1  •  vitamin B-12 (CYANOCOBALAMIN) 1000 MCG tablet, Take 1,000 mcg by mouth Daily., Disp: , Rfl:     OBJECTIVE  Vital Signs:   /72 (BP Location: Left arm, Patient Position: Sitting, Cuff Size: Adult)   Pulse 73   Temp 98.2 °F (36.8  "°C) (Temporal)   Resp 20   Ht 170.2 cm (67.01\")   Wt 86.5 kg (190 lb 12.8 oz)   SpO2 97%   BMI 29.88 kg/m²    Estimated body mass index is 29.88 kg/m² as calculated from the following:    Height as of this encounter: 170.2 cm (67.01\").    Weight as of this encounter: 86.5 kg (190 lb 12.8 oz).     Wt Readings from Last 3 Encounters:   08/09/22 86.5 kg (190 lb 12.8 oz)   03/21/22 85 kg (187 lb 6.4 oz)   03/01/22 83.9 kg (185 lb)     BP Readings from Last 3 Encounters:   08/09/22 108/72   03/21/22 110/72   03/01/22 129/71       Physical Exam  Constitutional:       Appearance: Normal appearance.   Eyes:      Extraocular Movements: Extraocular movements intact.      Conjunctiva/sclera: Conjunctivae normal.   Cardiovascular:      Rate and Rhythm: Normal rate and regular rhythm.      Pulses: Normal pulses.      Heart sounds: Normal heart sounds. No murmur heard.  Pulmonary:      Effort: Pulmonary effort is normal. No respiratory distress.      Breath sounds: Normal breath sounds. No stridor. No wheezing, rhonchi or rales.   Chest:      Chest wall: No tenderness.   Abdominal:      General: Bowel sounds are normal. There is no distension.      Palpations: Abdomen is soft. There is no mass.      Tenderness: There is no abdominal tenderness. There is no right CVA tenderness, left CVA tenderness, guarding or rebound.      Hernia: No hernia is present.   Musculoskeletal:         General: Normal range of motion.      Cervical back: Normal range of motion and neck supple.      Right lower leg: No edema.      Left lower leg: No edema.   Skin:     General: Skin is warm and dry.   Neurological:      Mental Status: She is alert and oriented to person, place, and time.   Psychiatric:         Mood and Affect: Mood normal.         Behavior: Behavior normal.         Thought Content: Thought content normal.         Judgment: Judgment normal.          Result Review    CMP    CMP 12/28/21 8/8/22   Glucose 88 91   BUN 16 12   Creatinine " 0.84 0.85   eGFR Non African Am 68    Sodium 141 141   Potassium 4.2 4.3   Chloride 107 105   Calcium 9.8 9.0   Albumin 4.30 4.40   Total Bilirubin 0.4 0.3   Alkaline Phosphatase 72 69   AST (SGOT) 32 31   ALT (SGPT) 49 (A) 43 (A)   (A) Abnormal value            CBC w/diff    CBC w/Diff 12/28/21 8/8/22   WBC 5.96 5.28   RBC 4.47 4.54   Hemoglobin 14.3 14.0   Hematocrit 42.1 42.4   MCV 94.2 93.4   MCH 32.0 30.8   MCHC 34.0 33.0   RDW 12.5 12.3   Platelets 222 205   Neutrophil Rel % 47.0 50.1   Immature Granulocyte Rel % 0.2 0.0   Lymphocyte Rel % 41.3 37.7   Monocyte Rel % 7.7 7.8   Eosinophil Rel % 3.5 4.0   Basophil Rel % 0.3 0.4           Lipid Panel    Lipid Panel 12/28/21 8/8/22   Total Cholesterol 238 (A) 212 (A)   Triglycerides 332 (A) 323 (A)   HDL Cholesterol 37 (A) 36 (A)   VLDL Cholesterol 61 (A) 57 (A)   LDL Cholesterol  140 (A) 119 (A)   LDL/HDL Ratio 3.64 3.09   (A) Abnormal value            TSH    TSH 12/28/21 8/8/22   TSH 3.460 3.550           Lab Results   Component Value Date    LCNJ16FL 34.3 08/08/2022     Lab Results   Component Value Date    AAWMLIXB98 629 12/28/2021     Lab Results   Component Value Date    RBCUA None Seen 08/08/2022    WBCUA None Seen 08/08/2022    BACTERIA None Seen 08/08/2022    SQUAMEPIUA 0-2 08/08/2022    METHODOLOGY Manual Light Microscopy 08/08/2022    COLORU Yellow 08/08/2022    CLARITYU Clear 08/08/2022    PHUR 5.5 08/08/2022    SPECGRAVUR 1.025 08/08/2022    GLUCOSEU Negative 08/08/2022    KETONESU Negative 08/08/2022    BILIRUBINUR Negative 08/08/2022    BLOODU Trace (A) 08/08/2022    PROTEINUA Negative 08/08/2022    LEUKOCYTESUR Negative 08/08/2022    NITRITEU Negative 08/08/2022    UROBILINOGEN 0.2 E.U./dL 08/08/2022       No Images in the past 120 days found..     The above data has been reviewed by JIM Lino 08/09/2022 11:38 EDT.          Patient Care Team:  Shanta Hannah APRN as PCP - General (Internal Medicine)    BMI is >= 25 and <30. (Overweight) The  following options were offered after discussion;: exercise counseling/recommendations and nutrition counseling/recommendations       ASSESSMENT & PLAN    Diagnoses and all orders for this visit:    1. Mixed hyperlipidemia  Assessment & Plan:  Cholesterol triglycerides are still elevated.  She is tolerating pravastatin 20 mg daily.  Plan: Increase pravastatin to 40 mg daily.    Orders:  -     Comprehensive metabolic panel; Future  -     Lipid panel; Future  -     TSH+Free T4; Future  -     CBC w AUTO Differential; Future  -     Urinalysis With Culture If Indicated -; Future    2. Vitamin D deficiency  Assessment & Plan:  Vitamin D is low normal.  She is taking vitamin D supplementation.  Plan: Continue.    Orders:  -     Vitamin D 25 hydroxy; Future    3. Vitamin B 12 deficiency    4. Multiple sclerosis (HCC)  Assessment & Plan:  Patient has multiple sclerosis.  She was diagnosed several years ago.  She does have chronic pain.  She would like a referral to a new MS clinic.  She is taking baclofen in the mornings and gabapentin at night for pain control.  She feels like it is tolerable until she can get in with the MS clinic.  Plan: Continue current regimen.  Referral to MS clinic.      5. Chronic pain syndrome    6. Hematuria, unspecified type  Assessment & Plan:  Patient denies any dysuria, fever, chills, nausea, vomiting, urgency or frequency.  Her urinalysis did show trace of blood.  Plan: Recheck urinalysis.    Orders:  -     Urinalysis With Culture If Indicated -  -     Urinalysis With Culture If Indicated -; Future    7. Focal seizures (HCC)  Assessment & Plan:  Denies a history of focal seizures.  He takes clonazepam 0.125 mg about 3 times a week.  No seizures.  Plan: Continue current regimen.      Other orders  -     pravastatin (PRAVACHOL) 40 MG tablet; Take 1 tablet by mouth Daily.  Dispense: 90 tablet; Refill: 1       Tobacco Use: Medium Risk   • Smoking Tobacco Use: Former Smoker   • Smokeless Tobacco  Use: Never Used       Follow Up     Return in about 3 months (around 11/9/2022) for Medicare Wellness.      Patient was given instructions and counseling regarding her condition or for health maintenance advice. Please see specific information pulled into the AVS if appropriate.   I have reviewed information obtained and documented by others and I have confirmed the accuracy of this documented note.    Shanta Hannah, APRN

## 2022-08-09 NOTE — ASSESSMENT & PLAN NOTE
Patient denies any dysuria, fever, chills, nausea, vomiting, urgency or frequency.  Her urinalysis did show trace of blood.  Plan: Recheck urinalysis.

## 2022-09-18 DIAGNOSIS — G35 MULTIPLE SCLEROSIS: ICD-10-CM

## 2022-09-19 RX ORDER — GABAPENTIN 300 MG/1
300 CAPSULE ORAL 2 TIMES DAILY
Qty: 180 CAPSULE | Refills: 0 | Status: SHIPPED | OUTPATIENT
Start: 2022-09-19 | End: 2023-01-31 | Stop reason: SDUPTHER

## 2022-10-03 DIAGNOSIS — G35 MULTIPLE SCLEROSIS: ICD-10-CM

## 2022-10-03 RX ORDER — CHLORZOXAZONE 500 MG/1
500 TABLET ORAL
Qty: 180 TABLET | Refills: 0 | Status: SHIPPED | OUTPATIENT
Start: 2022-10-03 | End: 2023-03-08 | Stop reason: SDUPTHER

## 2022-10-04 ENCOUNTER — OFFICE VISIT (OUTPATIENT)
Dept: INTERNAL MEDICINE | Facility: CLINIC | Age: 67
End: 2022-10-04

## 2022-10-04 VITALS
HEIGHT: 67 IN | WEIGHT: 188.8 LBS | TEMPERATURE: 96.9 F | HEART RATE: 59 BPM | OXYGEN SATURATION: 98 % | SYSTOLIC BLOOD PRESSURE: 110 MMHG | DIASTOLIC BLOOD PRESSURE: 70 MMHG | BODY MASS INDEX: 29.63 KG/M2 | RESPIRATION RATE: 18 BRPM

## 2022-10-04 DIAGNOSIS — M79.644 THUMB PAIN, RIGHT: ICD-10-CM

## 2022-10-04 DIAGNOSIS — M65.311 TRIGGER FINGER OF RIGHT THUMB: Primary | ICD-10-CM

## 2022-10-04 PROCEDURE — 99213 OFFICE O/P EST LOW 20 MIN: CPT

## 2022-10-04 NOTE — ASSESSMENT & PLAN NOTE
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  She states it has been persistently painful.  Tender to the touch.  She has been trying to do stretching and applying heat and ice.  She is not getting any improvement.  She is also using a brace.  She also has noticed that it is catching with range of motion.  Plan: Referral to Ortho.

## 2022-10-04 NOTE — PROGRESS NOTES
Answers for HPI/ROS submitted by the patient on 10/2/2022  Please describe your symptoms.: Injured right thumb 3 weeks ago and is not getting better. Its painful.  Have you had these symptoms before?: No  How long have you been having these symptoms?: Greater than 2 weeks  Please list any medications you are currently taking for this condition.: Gabapentin, Pravastatin, Chlorzoxazone, Multi vitamin, Vitamin D3  Please describe any probable cause for these symptoms. : Repetitive injuring moving landscaping stones  What is the primary reason for your visit?: Other    Chief Complaint  Hand Pain (Pt states that her thumb and her palm mostly in her palm she states that it locks up and hurts. She states that this happened about 4 weeks ago.)    GUERRERO Payne presents to [facility] with complaints of right thumb pain. She was doing landscaping and picking up bricks, repetitiely. She woke up and was sore.  She has been applying heat/ice and stretching. No improvement.      History of Present Illness  Past Medical History:   Diagnosis Date   • Multiple sclerosis (HCC)       Family History   Problem Relation Age of Onset   • Pancreatitis Mother    • Heart disease Father       Past Surgical History:   Procedure Laterality Date   • KNEE ARTHROSCOPY Right 2018   • SHOULDER ARTHROSCOPY Right 1998        Current Outpatient Medications:   •  chlorzoxazone (PARAFON FORTE) 500 MG tablet, Take 1/2 tablet by mouth in the afternoon and take 1 tablet by mouth at bedtime, Disp: 180 tablet, Rfl: 0  •  cholecalciferol (VITAMIN D3) 25 MCG (1000 UT) tablet, Take 2,000 Units by mouth Daily., Disp: , Rfl:   •  clonazePAM (KlonoPIN) 0.125 MG disintegrating tablet, Place 1 tablet on the tongue and dissolve daily., Disp: 90 tablet, Rfl: 0  •  gabapentin (NEURONTIN) 300 MG capsule, Take 1 capsule by mouth 2 (Two) Times a Day., Disp: 180 capsule, Rfl: 0  •  multivitamin with minerals tablet tablet,  "Take 1 tablet by mouth Daily., Disp: , Rfl:   •  pravastatin (PRAVACHOL) 40 MG tablet, Take 1 tablet by mouth Daily., Disp: 90 tablet, Rfl: 1  •  vitamin B-12 (CYANOCOBALAMIN) 1000 MCG tablet, Take 1,000 mcg by mouth Daily., Disp: , Rfl:     OBJECTIVE  Vital Signs:   /70 (BP Location: Left arm)   Pulse 59   Temp 96.9 °F (36.1 °C) (Temporal)   Resp 18   Ht 170.2 cm (67.01\")   Wt 85.6 kg (188 lb 12.8 oz)   SpO2 98%   BMI 29.56 kg/m²    Estimated body mass index is 29.56 kg/m² as calculated from the following:    Height as of this encounter: 170.2 cm (67.01\").    Weight as of this encounter: 85.6 kg (188 lb 12.8 oz).     Wt Readings from Last 3 Encounters:   10/04/22 85.6 kg (188 lb 12.8 oz)   08/09/22 86.5 kg (190 lb 12.8 oz)   03/21/22 85 kg (187 lb 6.4 oz)     BP Readings from Last 3 Encounters:   10/04/22 110/70   08/09/22 108/72   03/21/22 110/72       Physical Exam  Constitutional:       Appearance: Normal appearance.   HENT:      Head: Normocephalic and atraumatic.   Eyes:      Extraocular Movements: Extraocular movements intact.      Conjunctiva/sclera: Conjunctivae normal.   Cardiovascular:      Rate and Rhythm: Normal rate.      Pulses: Normal pulses.   Pulmonary:      Effort: Pulmonary effort is normal.      Breath sounds: Normal breath sounds.   Musculoskeletal:      Right hand: Tenderness present. Decreased range of motion (r thumb).      Cervical back: Normal range of motion.   Skin:     General: Skin is warm and dry.   Neurological:      Mental Status: She is alert and oriented to person, place, and time.   Psychiatric:         Mood and Affect: Mood normal.         Behavior: Behavior normal.         Thought Content: Thought content normal.         Judgment: Judgment normal.          Result Review        No Images in the past 120 days found..     The above data has been reviewed by JIM Lino 10/04/2022 10:52 EDT.          Patient Care Team:  Shanta Hannah APRN as PCP - General " (Internal Medicine)  ASSESSMENT & PLAN    Diagnoses and all orders for this visit:    1. 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  She states it has been persistently painful.  Tender to the touch.  She has been trying to do stretching and applying heat and ice.  She is not getting any improvement.  She is also using a brace.  She also has noticed that it is catching with range of motion.  Plan: Referral to Ortho.      2. Thumb pain, right  -     Ambulatory Referral to Orthopedic Surgery       Tobacco Use: Medium Risk   • Smoking Tobacco Use: Former Smoker   • Smokeless Tobacco Use: Never Used       Follow Up     Return for Next scheduled follow up, Medicare Wellness.      Patient was given instructions and counseling regarding her condition or for health maintenance advice. Please see specific information pulled into the AVS if appropriate.   I have reviewed information obtained and documented by others and I have confirmed the accuracy of this documented note.    JIM Lion

## 2022-10-10 ENCOUNTER — OFFICE VISIT (OUTPATIENT)
Dept: ORTHOPEDIC SURGERY | Facility: CLINIC | Age: 67
End: 2022-10-10

## 2022-10-10 VITALS — HEIGHT: 67 IN | BODY MASS INDEX: 29.51 KG/M2 | WEIGHT: 188 LBS

## 2022-10-10 DIAGNOSIS — M65.311 TRIGGER FINGER OF RIGHT THUMB: Primary | ICD-10-CM

## 2022-10-10 PROCEDURE — 99203 OFFICE O/P NEW LOW 30 MIN: CPT | Performed by: ORTHOPAEDIC SURGERY

## 2022-10-10 PROCEDURE — 20550 NJX 1 TENDON SHEATH/LIGAMENT: CPT | Performed by: ORTHOPAEDIC SURGERY

## 2022-10-10 RX ORDER — TRIAMCINOLONE ACETONIDE 40 MG/ML
40 INJECTION, SUSPENSION INTRA-ARTICULAR; INTRAMUSCULAR
Status: COMPLETED | OUTPATIENT
Start: 2022-10-10 | End: 2022-10-10

## 2022-10-10 RX ORDER — LIDOCAINE HYDROCHLORIDE 10 MG/ML
1 INJECTION, SOLUTION INFILTRATION; PERINEURAL
Status: COMPLETED | OUTPATIENT
Start: 2022-10-10 | End: 2022-10-10

## 2022-10-10 RX ADMIN — LIDOCAINE HYDROCHLORIDE 1 ML: 10 INJECTION, SOLUTION INFILTRATION; PERINEURAL at 14:47

## 2022-10-10 RX ADMIN — TRIAMCINOLONE ACETONIDE 40 MG: 40 INJECTION, SUSPENSION INTRA-ARTICULAR; INTRAMUSCULAR at 14:47

## 2022-10-10 NOTE — PROGRESS NOTES
"Chief Complaint  Initial Evaluation of the Right Hand     Subjective      Tania Payne presents to [facility] for an evaluation of right hand. Patient has locking and catching of the right thumb. She states these symptoms have worsened with time. She states her thumb is locked every morning. Locking and catching of the right thumb has been ongoing for 1 month. Patient reports having MS, she does notice early locking and catching symptoms of the middle and ring finger.     Allergies   Allergen Reactions   • Lamictal [Lamotrigine] Rash   • Novocain [Procaine] Rash        Social History     Socioeconomic History   • Marital status:    Tobacco Use   • Smoking status: Former     Packs/day: 0.50     Years: 10.00     Pack years: 5.00     Types: Cigarettes     Start date:      Quit date: 1981     Years since quittin.8   • Smokeless tobacco: Never   Vaping Use   • Vaping Use: Never used   Substance and Sexual Activity   • Alcohol use: Never   • Drug use: Never   • Sexual activity: Defer        Review of Systems     Objective   Vital Signs:   Ht 170.2 cm (67\")   Wt 85.3 kg (188 lb)   BMI 29.44 kg/m²       Physical Exam  Constitutional:       Appearance: Normal appearance. Patient is well-developed and normal weight.   HENT:      Head: Normocephalic.      Right Ear: Hearing and external ear normal.      Left Ear: Hearing and external ear normal.      Nose: Nose normal.   Eyes:      Conjunctiva/sclera: Conjunctivae normal.   Cardiovascular:      Rate and Rhythm: Normal rate.   Pulmonary:      Effort: Pulmonary effort is normal.      Breath sounds: No wheezing or rales.   Abdominal:      Palpations: Abdomen is soft.      Tenderness: There is no abdominal tenderness.   Musculoskeletal:      Cervical back: Normal range of motion.   Skin:     Findings: No rash.   Neurological:      Mental Status: Patient  is alert and oriented to person, place, and time.   Psychiatric:         " Mood and Affect: Mood and affect normal.         Judgment: Judgment normal.       Ortho Exam      RIGHT HAND: Tender A1 pulley. No active triggering in office. Full wrist extension, full wrist flexion, full , full thumb opposition. Full PIP flexors, full DIP flexors, full PIP extensors. Sensation grossly intact. Neurovascular intact.  Negative Tinel's. Negative Finkelstein's. Negative median nerve compression testing. No swelling, skin discoloration or atrophy.     Small Joint Arthrocentesis: Right Trigger Thumb  Consent given by: patient  Site marked: site marked  Timeout: Immediately prior to procedure a time out was called to verify the correct patient, procedure, equipment, support staff and site/side marked as required   Supporting Documentation  Indications: pain   Procedure Details  Location: right trigger thumb.  Preparation: Patient was prepped and draped in the usual sterile fashion  Needle gauge: 23G.  Medications administered: 1 mL lidocaine 1 %; 40 mg triamcinolone acetonide 40 MG/ML  Patient tolerance: patient tolerated the procedure well with no immediate complications              Imaging Results (Most Recent)     None           Result Review :         No results found.          Assessment and Plan     Diagnoses and all orders for this visit:    1. Trigger finger of right thumb (Primary)        Treatment plans consist of trigger thumb release surgery versus trigger thumb injection. She may also monitor her trigger thumb unti lshe is ready for trigger finger release or injection.     Risks, benefits and post-operative care of right trigger thumb release discussed.     Patient understands her options and wishes to proceed with a trigger thumb injection.   Right trigger thumb injection administered, she tolerated this well.     Call or return if worsening symptoms.    Follow Up     PRN.       Patient was given instructions and counseling regarding her condition or for health maintenance advice.  Please see specific information pulled into the AVS if appropriate.     Scribed for Gagan Ballesteros MD by Kae Pruitt.  10/10/22   14:35 EDT    I have personally performed the services described in this document as scribed by the above individual and it is both accurate and complete. Gagan Ballesteros MD 10/10/22

## 2022-11-08 ENCOUNTER — OFFICE VISIT (OUTPATIENT)
Dept: INTERNAL MEDICINE | Facility: CLINIC | Age: 67
End: 2022-11-08

## 2022-11-08 VITALS
HEART RATE: 61 BPM | OXYGEN SATURATION: 97 % | WEIGHT: 187 LBS | BODY MASS INDEX: 29.35 KG/M2 | RESPIRATION RATE: 18 BRPM | DIASTOLIC BLOOD PRESSURE: 86 MMHG | TEMPERATURE: 96.9 F | HEIGHT: 67 IN | SYSTOLIC BLOOD PRESSURE: 131 MMHG

## 2022-11-08 DIAGNOSIS — R56.9 FOCAL SEIZURES: ICD-10-CM

## 2022-11-08 DIAGNOSIS — Z23 NEED FOR INFLUENZA VACCINATION: ICD-10-CM

## 2022-11-08 DIAGNOSIS — Z23 NEED FOR VACCINATION: ICD-10-CM

## 2022-11-08 DIAGNOSIS — Z00.00 WELCOME TO MEDICARE PREVENTIVE VISIT: Primary | ICD-10-CM

## 2022-11-08 DIAGNOSIS — N32.89 BLADDER SPASMS: ICD-10-CM

## 2022-11-08 DIAGNOSIS — Z11.59 NEED FOR HEPATITIS C SCREENING TEST: ICD-10-CM

## 2022-11-08 LAB
BILIRUB BLD-MCNC: NEGATIVE MG/DL
CLARITY, POC: CLEAR
COLOR UR: NORMAL
EXPIRATION DATE: NORMAL
GLUCOSE UR STRIP-MCNC: NEGATIVE MG/DL
KETONES UR QL: NEGATIVE
LEUKOCYTE EST, POC: NEGATIVE
Lab: NORMAL
NITRITE UR-MCNC: NEGATIVE MG/ML
PH UR: 6 [PH] (ref 5–8)
PROT UR STRIP-MCNC: NEGATIVE MG/DL
RBC # UR STRIP: NEGATIVE /UL
SP GR UR: 1.01 (ref 1–1.03)
UROBILINOGEN UR QL: NORMAL

## 2022-11-08 PROCEDURE — 1159F MED LIST DOCD IN RCRD: CPT

## 2022-11-08 PROCEDURE — 1170F FXNL STATUS ASSESSED: CPT

## 2022-11-08 PROCEDURE — G0008 ADMIN INFLUENZA VIRUS VAC: HCPCS

## 2022-11-08 PROCEDURE — G0009 ADMIN PNEUMOCOCCAL VACCINE: HCPCS

## 2022-11-08 PROCEDURE — 90662 IIV NO PRSV INCREASED AG IM: CPT

## 2022-11-08 PROCEDURE — 90677 PCV20 VACCINE IM: CPT

## 2022-11-08 PROCEDURE — 81003 URINALYSIS AUTO W/O SCOPE: CPT

## 2022-11-08 PROCEDURE — G0438 PPPS, INITIAL VISIT: HCPCS

## 2022-11-08 RX ORDER — CLONAZEPAM 0.12 MG/1
0.12 TABLET, ORALLY DISINTEGRATING ORAL DAILY
Qty: 30 TABLET | Refills: 0 | Status: SHIPPED | OUTPATIENT
Start: 2022-11-08 | End: 2023-01-20 | Stop reason: SDUPTHER

## 2022-11-08 NOTE — ASSESSMENT & PLAN NOTE
Age related recommendations discussed with pt.  She does get regular eye/dental exams.  Exercise-she does low impact exercises.

## 2022-11-08 NOTE — PROGRESS NOTES
The ABCs of the Annual Wellness Visit  [place] to [employer] Visit    Chief Complaint   Patient presents with   • Medicare Wellness Visit     Pt states that she is being seen for a Medicare Wellness.      Subjective {   History of Present Illness:  Tania Payne is a 67 y.o. female who presents for a  Welcome to Medicare Visit.    C/o sharp, intermittent pain in the bladder area for 3 weeks. Pt states it went away 3 days ago. Denies dysuria, fever, frequency.    The following portions of the patient's history were reviewed and   updated as appropriate: allergies, current medications, past family history, past medical history, past social history, past surgical history and problem list.     Compared to one year ago, the patient feels her physical   health is better.    Compared to one year ago, the patient feels her mental   health is better.    Recent Hospitalizations:  She was not admitted to the hospital during the last year.       Current Medical Providers:  Patient Care Team:  Shanta Hannah APRN as PCP - General (Internal Medicine)    Outpatient Medications Prior to Visit   Medication Sig Dispense Refill   • chlorzoxazone (PARAFON FORTE) 500 MG tablet Take 1/2 tablet by mouth in the afternoon and take 1 tablet by mouth at bedtime 180 tablet 0   • cholecalciferol (VITAMIN D3) 25 MCG (1000 UT) tablet Take 2,000 Units by mouth Daily.     • gabapentin (NEURONTIN) 300 MG capsule Take 1 capsule by mouth 2 (Two) Times a Day. 180 capsule 0   • multivitamin with minerals tablet tablet Take 1 tablet by mouth Daily.     • pravastatin (PRAVACHOL) 40 MG tablet Take 1 tablet by mouth Daily. 90 tablet 1   • vitamin B-12 (CYANOCOBALAMIN) 1000 MCG tablet Take 1,000 mcg by mouth Daily.     • clonazePAM (KlonoPIN) 0.125 MG disintegrating tablet Place 1 tablet on the tongue and dissolve daily. 90 tablet 0     No facility-administered medications prior to visit.       No opioid medication identified on active medication list. I have  "reviewed chart for other potential  high risk medication/s and harmful drug interactions in the elderly.          Aspirin is not on active medication list.  Aspirin use is not indicated based on review of current medical condition/s. Risk of harm outweighs potential benefits.  .    Patient Active Problem List   Diagnosis   • Vitamin D deficiency   • Vitamin B 12 deficiency   • Mixed hyperlipidemia   • Iron deficiency anemia   • Fatigue   • Multiple sclerosis (HCC)   • Focal seizures (HCC)   • Chronic pain syndrome   • Hemifacial spasm of left side of face   • Migraine without aura and without status migrainosus, not intractable   • Chronic non-specific white matter lesions on MRI   • Hematuria   • Trigger finger of right thumb   • Welcome to Medicare preventive visit     Advance Care Planning  Advance Directive is not on file.  ACP discussion was held with the patient during this visit. Patient has an advance directive (not in EMR), copy requested.          Objective      Vitals:    11/08/22 1112   BP: 131/86   BP Location: Right arm   Pulse: 61   Resp: 18   Temp: 96.9 °F (36.1 °C)   TempSrc: Temporal   SpO2: 97%   Weight: 84.8 kg (187 lb)   Height: 170.2 cm (67.01\")     Estimated body mass index is 29.28 kg/m² as calculated from the following:    Height as of this encounter: 170.2 cm (67.01\").    Weight as of this encounter: 84.8 kg (187 lb).    BMI is >= 25 and <30. (Overweight) The following options were offered after discussion;: exercise counseling/recommendations and nutrition counseling/recommendations      Does the patient have evidence of cognitive impairment? No    Physical Exam  Constitutional:       Appearance: Normal appearance.   HENT:      Head: Normocephalic and atraumatic.   Eyes:      Extraocular Movements: Extraocular movements intact.      Conjunctiva/sclera: Conjunctivae normal.   Cardiovascular:      Rate and Rhythm: Normal rate.   Pulmonary:      Effort: Pulmonary effort is normal. "   Musculoskeletal:         General: Normal range of motion.      Cervical back: Normal range of motion and neck supple.      Right lower leg: No edema.      Left lower leg: No edema.   Skin:     General: Skin is warm and dry.   Neurological:      Mental Status: She is alert and oriented to person, place, and time.   Psychiatric:         Mood and Affect: Mood normal.         Behavior: Behavior normal.         Thought Content: Thought content normal.         Judgment: Judgment normal.              Procedures       HEALTH RISK ASSESSMENT    Smoking Status:  Social History     Tobacco Use   Smoking Status Former   • Packs/day: 0.50   • Years: 10.00   • Pack years: 5.00   • Types: Cigarettes   • Start date:    • Quit date: 1981   • Years since quittin.9   Smokeless Tobacco Never     Alcohol Consumption:  Social History     Substance and Sexual Activity   Alcohol Use Never       Fall Risk Screen:    ROSITA Fall Risk Assessment was completed, and patient is at LOW risk for falls.Assessment completed on:2022    Depression Screen:   PHQ-2/PHQ-9 Depression Screening 2022   Little Interest or Pleasure in Doing Things 0-->not at all   Feeling Down, Depressed or Hopeless 0-->not at all   PHQ-9: Brief Depression Severity Measure Score 0       Health Habits and Functional and Cognitive Screening:  Functional & Cognitive Status 2022   Do you have difficulty preparing food and eating? No   Do you have difficulty bathing yourself, getting dressed or grooming yourself? No   Do you have difficulty using the toilet? No   Do you have difficulty moving around from place to place? No   Do you have trouble with steps or getting out of a bed or a chair? No   Do you need help using the phone?  No   Are you deaf or do you have serious difficulty hearing?  No   Do you need help with transportation? No   Do you need help shopping? No   Do you need help preparing meals?  No   Do you need help with housework?  No   Do  you need help with laundry? No   Do you need help taking your medications? No   Do you need help managing money? No   Do you ever drive or ride in a car without wearing a seat belt? No   Have you felt unusual stress, anger or loneliness in the last month? No   Who do you live with? Spouse   If you need help, do you have trouble finding someone available to you? No   Have you been bothered in the last four weeks by sexual problems? No       Visual Acuity:    No results found.    Age-appropriate Screening Schedule:  Refer to the list below for future screening recommendations based on patient's age, sex and/or medical conditions. Orders for these recommended tests are listed in the plan section. The patient has been provided with a written plan.    Health Maintenance   Topic Date Due   • TDAP/TD VACCINES (1 - Tdap) Never done   • ZOSTER VACCINE (1 of 2) Never done   • LIPID PANEL  08/08/2023   • DXA SCAN  02/08/2024   • MAMMOGRAM  03/21/2024   • INFLUENZA VACCINE  Completed   Flu/PNA-will gettoday; TDAP/shingrex/covid-19 booster-counseled       Assessment & Plan   CMS Preventative Services Quick Reference  Risk Factors Identified During Encounter  Chronic Pain   Immunizations Discussed/Encouraged (specific Immunizations; Tdap, Influenza, Prevnar 20 (Pneumococcal 20-valent conjugate) and Shingrix  Obesity/Overweight   The above risks/problems have been discussed with the patient.  Pertinent information has been shared with the patient in the After Visit Summary.  Follow up plans and orders are seen below in the Assessment/Plan Section.    Diagnoses and all orders for this visit:    1. Welcome to Medicare preventive visit (Primary)  Assessment & Plan:  Age related recommendations discussed with pt.  She does get regular eye/dental exams.  Exercise-she does low impact exercises.    Orders:  -     POCT urinalysis dipstick, automated    2. Need for hepatitis C screening test  -     Hepatitis C antibody; Future    3.  Bladder spasms  -     Urinalysis With Culture If Indicated -    4. Focal seizures (HCC)  -     clonazePAM (KlonoPIN) 0.125 MG disintegrating tablet; Place 1 tablet on the tongue and dissolve daily.  Dispense: 30 tablet; Refill: 0    5. Need for influenza vaccination  -     Fluzone High-Dose 65+yrs ([id number])    6. Need for vaccination  -     Pneumococcal Conjugate Vaccine 20-Valent (PCV20)      Follow Up:   Return for Next scheduled follow up.     An After Visit Summary and PPPS were made available to the patient.

## 2023-01-20 DIAGNOSIS — R56.9 FOCAL SEIZURES: ICD-10-CM

## 2023-01-23 ENCOUNTER — LAB (OUTPATIENT)
Dept: LAB | Facility: HOSPITAL | Age: 68
End: 2023-01-23
Payer: MEDICARE

## 2023-01-23 DIAGNOSIS — E55.9 VITAMIN D DEFICIENCY: ICD-10-CM

## 2023-01-23 DIAGNOSIS — E78.2 MIXED HYPERLIPIDEMIA: ICD-10-CM

## 2023-01-23 DIAGNOSIS — R31.9 HEMATURIA, UNSPECIFIED TYPE: ICD-10-CM

## 2023-01-23 LAB
25(OH)D3 SERPL-MCNC: 42 NG/ML (ref 30–100)
ALBUMIN SERPL-MCNC: 4.5 G/DL (ref 3.5–5.2)
ALBUMIN/GLOB SERPL: 1.7 G/DL
ALP SERPL-CCNC: 69 U/L (ref 39–117)
ALT SERPL W P-5'-P-CCNC: 39 U/L (ref 1–33)
ANION GAP SERPL CALCULATED.3IONS-SCNC: 8.7 MMOL/L (ref 5–15)
AST SERPL-CCNC: 29 U/L (ref 1–32)
BACTERIA UR QL AUTO: ABNORMAL /HPF
BASOPHILS # BLD AUTO: 0.02 10*3/MM3 (ref 0–0.2)
BASOPHILS NFR BLD AUTO: 0.3 % (ref 0–1.5)
BILIRUB SERPL-MCNC: 0.3 MG/DL (ref 0–1.2)
BILIRUB UR QL STRIP: NEGATIVE
BUN SERPL-MCNC: 11 MG/DL (ref 8–23)
BUN/CREAT SERPL: 12.1 (ref 7–25)
CALCIUM SPEC-SCNC: 9.7 MG/DL (ref 8.6–10.5)
CHLORIDE SERPL-SCNC: 105 MMOL/L (ref 98–107)
CHOLEST SERPL-MCNC: 191 MG/DL (ref 0–200)
CLARITY UR: CLEAR
CO2 SERPL-SCNC: 26.3 MMOL/L (ref 22–29)
COLOR UR: YELLOW
CREAT SERPL-MCNC: 0.91 MG/DL (ref 0.57–1)
DEPRECATED RDW RBC AUTO: 45.5 FL (ref 37–54)
EGFRCR SERPLBLD CKD-EPI 2021: 69.7 ML/MIN/1.73
EOSINOPHIL # BLD AUTO: 0.2 10*3/MM3 (ref 0–0.4)
EOSINOPHIL NFR BLD AUTO: 3.5 % (ref 0.3–6.2)
ERYTHROCYTE [DISTWIDTH] IN BLOOD BY AUTOMATED COUNT: 12.7 % (ref 12.3–15.4)
GLOBULIN UR ELPH-MCNC: 2.7 GM/DL
GLUCOSE SERPL-MCNC: 95 MG/DL (ref 65–99)
GLUCOSE UR STRIP-MCNC: NEGATIVE MG/DL
HCT VFR BLD AUTO: 44.3 % (ref 34–46.6)
HDLC SERPL-MCNC: 46 MG/DL (ref 40–60)
HGB BLD-MCNC: 14.4 G/DL (ref 12–15.9)
HGB UR QL STRIP.AUTO: ABNORMAL
IMM GRANULOCYTES # BLD AUTO: 0.01 10*3/MM3 (ref 0–0.05)
IMM GRANULOCYTES NFR BLD AUTO: 0.2 % (ref 0–0.5)
KETONES UR QL STRIP: NEGATIVE
LDLC SERPL CALC-MCNC: 112 MG/DL (ref 0–100)
LDLC/HDLC SERPL: 2.32 {RATIO}
LEUKOCYTE ESTERASE UR QL STRIP.AUTO: ABNORMAL
LYMPHOCYTES # BLD AUTO: 2.42 10*3/MM3 (ref 0.7–3.1)
LYMPHOCYTES NFR BLD AUTO: 42.2 % (ref 19.6–45.3)
MCH RBC QN AUTO: 31.4 PG (ref 26.6–33)
MCHC RBC AUTO-ENTMCNC: 32.5 G/DL (ref 31.5–35.7)
MCV RBC AUTO: 96.7 FL (ref 79–97)
MONOCYTES # BLD AUTO: 0.38 10*3/MM3 (ref 0.1–0.9)
MONOCYTES NFR BLD AUTO: 6.6 % (ref 5–12)
NEUTROPHILS NFR BLD AUTO: 2.7 10*3/MM3 (ref 1.7–7)
NEUTROPHILS NFR BLD AUTO: 47.2 % (ref 42.7–76)
NITRITE UR QL STRIP: NEGATIVE
PH UR STRIP.AUTO: 5.5 [PH] (ref 5–8)
PLATELET # BLD AUTO: 240 10*3/MM3 (ref 140–450)
PMV BLD AUTO: 8.8 FL (ref 6–12)
POTASSIUM SERPL-SCNC: 4.2 MMOL/L (ref 3.5–5.2)
PROT SERPL-MCNC: 7.2 G/DL (ref 6–8.5)
PROT UR QL STRIP: NEGATIVE
RBC # BLD AUTO: 4.58 10*6/MM3 (ref 3.77–5.28)
RBC # UR STRIP: ABNORMAL /HPF
REF LAB TEST METHOD: ABNORMAL
SODIUM SERPL-SCNC: 140 MMOL/L (ref 136–145)
SP GR UR STRIP: 1.02 (ref 1–1.03)
SQUAMOUS #/AREA URNS HPF: ABNORMAL /HPF
T4 FREE SERPL-MCNC: 1.03 NG/DL (ref 0.93–1.7)
TRIGL SERPL-MCNC: 191 MG/DL (ref 0–150)
TSH SERPL DL<=0.05 MIU/L-ACNC: 3.88 UIU/ML (ref 0.27–4.2)
UROBILINOGEN UR QL STRIP: ABNORMAL
VLDLC SERPL-MCNC: 33 MG/DL (ref 5–40)
WBC # UR STRIP: ABNORMAL /HPF
WBC NRBC COR # BLD: 5.73 10*3/MM3 (ref 3.4–10.8)

## 2023-01-23 PROCEDURE — 84443 ASSAY THYROID STIM HORMONE: CPT

## 2023-01-23 PROCEDURE — 36415 COLL VENOUS BLD VENIPUNCTURE: CPT

## 2023-01-23 PROCEDURE — 82306 VITAMIN D 25 HYDROXY: CPT

## 2023-01-23 PROCEDURE — 85025 COMPLETE CBC W/AUTO DIFF WBC: CPT

## 2023-01-23 PROCEDURE — 81001 URINALYSIS AUTO W/SCOPE: CPT

## 2023-01-23 PROCEDURE — 80061 LIPID PANEL: CPT

## 2023-01-23 PROCEDURE — 80053 COMPREHEN METABOLIC PANEL: CPT

## 2023-01-23 PROCEDURE — 84439 ASSAY OF FREE THYROXINE: CPT

## 2023-01-23 RX ORDER — CLONAZEPAM 0.12 MG/1
0.12 TABLET, ORALLY DISINTEGRATING ORAL DAILY
Qty: 30 TABLET | Refills: 0 | Status: SHIPPED | OUTPATIENT
Start: 2023-01-23 | End: 2023-03-08 | Stop reason: SDUPTHER

## 2023-01-27 ENCOUNTER — OFFICE VISIT (OUTPATIENT)
Dept: INTERNAL MEDICINE | Facility: CLINIC | Age: 68
End: 2023-01-27
Payer: MEDICARE

## 2023-01-27 VITALS
DIASTOLIC BLOOD PRESSURE: 73 MMHG | HEART RATE: 73 BPM | OXYGEN SATURATION: 97 % | HEIGHT: 67 IN | BODY MASS INDEX: 29.98 KG/M2 | TEMPERATURE: 98.4 F | SYSTOLIC BLOOD PRESSURE: 109 MMHG | WEIGHT: 191 LBS | RESPIRATION RATE: 18 BRPM

## 2023-01-27 DIAGNOSIS — E53.8 VITAMIN B 12 DEFICIENCY: ICD-10-CM

## 2023-01-27 DIAGNOSIS — G35 MULTIPLE SCLEROSIS: ICD-10-CM

## 2023-01-27 DIAGNOSIS — E78.2 MIXED HYPERLIPIDEMIA: ICD-10-CM

## 2023-01-27 DIAGNOSIS — E55.9 VITAMIN D DEFICIENCY: Primary | ICD-10-CM

## 2023-01-27 PROCEDURE — 99214 OFFICE O/P EST MOD 30 MIN: CPT

## 2023-01-27 NOTE — PROGRESS NOTES
"Chief Complaint  Follow-up (Pt states that she is being seen for a follow up and to go over labs and is overall doing very well.)    History of Present Illness  SUBJECTIVE  Tania Payne presents to [facility]  To follow up on labs.    Is doing well overall.  She does admit to some anxiety.  Denies any depression.  Denies any chest pain, shortness of breath, infections, nausea, vomiting, diarrhea, issues with bowel or bladder function.        Past Medical History:   Diagnosis Date   • Multiple sclerosis (HCC)       Family History   Problem Relation Age of Onset   • Pancreatitis Mother    • Heart disease Father       Past Surgical History:   Procedure Laterality Date   • KNEE ARTHROSCOPY Right 2018   • SHOULDER ARTHROSCOPY Right 1998        Current Outpatient Medications:   •  chlorzoxazone (PARAFON FORTE) 500 MG tablet, Take 1/2 tablet by mouth in the afternoon and take 1 tablet by mouth at bedtime, Disp: 180 tablet, Rfl: 0  •  cholecalciferol (VITAMIN D3) 25 MCG (1000 UT) tablet, Take 2,000 Units by mouth Daily., Disp: , Rfl:   •  clonazePAM (KlonoPIN) 0.125 MG disintegrating tablet, Place 1 tablet on the tongue and dissolve daily., Disp: 30 tablet, Rfl: 0  •  gabapentin (NEURONTIN) 300 MG capsule, Take 1 capsule by mouth 2 (Two) Times a Day., Disp: 180 capsule, Rfl: 0  •  multivitamin with minerals tablet tablet, Take 1 tablet by mouth Daily., Disp: , Rfl:   •  pravastatin (PRAVACHOL) 40 MG tablet, Take 1 tablet by mouth Daily., Disp: 90 tablet, Rfl: 1  •  vitamin B-12 (CYANOCOBALAMIN) 1000 MCG tablet, Take 1,000 mcg by mouth Daily., Disp: , Rfl:     OBJECTIVE  Vital Signs:   /73 (BP Location: Left arm)   Pulse 73   Temp 98.4 °F (36.9 °C) (Temporal)   Resp 18   Ht 170.2 cm (67.01\")   Wt 86.6 kg (191 lb)   SpO2 97%   BMI 29.91 kg/m²    Estimated body mass index is 29.91 kg/m² as calculated from the following:    Height as of this encounter: 170.2 cm (67.01\").    " Weight as of this encounter: 86.6 kg (191 lb).     Wt Readings from Last 3 Encounters:   01/27/23 86.6 kg (191 lb)   11/08/22 84.8 kg (187 lb)   10/10/22 85.3 kg (188 lb)     BP Readings from Last 3 Encounters:   01/27/23 109/73   11/08/22 131/86   10/04/22 110/70       Physical Exam  Vitals and nursing note reviewed.   Constitutional:       Appearance: Normal appearance.   HENT:      Head: Normocephalic and atraumatic.   Eyes:      Extraocular Movements: Extraocular movements intact.      Conjunctiva/sclera: Conjunctivae normal.   Cardiovascular:      Rate and Rhythm: Normal rate and regular rhythm.      Heart sounds: Normal heart sounds.   Pulmonary:      Effort: Pulmonary effort is normal.      Breath sounds: Normal breath sounds.   Abdominal:      General: Abdomen is flat. Bowel sounds are normal. There is no distension.      Palpations: Abdomen is soft. There is no mass.      Tenderness: There is no abdominal tenderness. There is no right CVA tenderness, left CVA tenderness, guarding or rebound.      Hernia: No hernia is present.   Musculoskeletal:         General: Normal range of motion.      Cervical back: Normal range of motion.      Right lower leg: No edema.      Left lower leg: No edema.   Skin:     General: Skin is warm and dry.   Neurological:      General: No focal deficit present.      Mental Status: She is alert and oriented to person, place, and time. Mental status is at baseline.   Psychiatric:         Mood and Affect: Mood normal.         Behavior: Behavior normal.         Thought Content: Thought content normal.         Judgment: Judgment normal.          Result Review    CMP    CMP 8/8/22 1/23/23   Glucose 91 95   BUN 12 11   Creatinine 0.85 0.91   eGFR 75.7 69.7   Sodium 141 140   Potassium 4.3 4.2   Chloride 105 105   Calcium 9.0 9.7   Total Protein 7.0 7.2   Albumin 4.40 4.5   Globulin 2.6 2.7   Total Bilirubin 0.3 0.3   Alkaline Phosphatase 69 69   AST (SGOT) 31 29   ALT (SGPT) 43 (A) 39 (A)    Albumin/Globulin Ratio 1.7 1.7   BUN/Creatinine Ratio 14.1 12.1   Anion Gap 12.6 8.7   (A) Abnormal value       Comments are available for some flowsheets but are not being displayed.           CBC w/diff    CBC w/Diff 8/8/22 1/23/23   WBC 5.28 5.73   RBC 4.54 4.58   Hemoglobin 14.0 14.4   Hematocrit 42.4 44.3   MCV 93.4 96.7   MCH 30.8 31.4   MCHC 33.0 32.5   RDW 12.3 12.7   Platelets 205 240   Neutrophil Rel % 50.1 47.2   Immature Granulocyte Rel % 0.0 0.2   Lymphocyte Rel % 37.7 42.2   Monocyte Rel % 7.8 6.6   Eosinophil Rel % 4.0 3.5   Basophil Rel % 0.4 0.3           Lipid Panel    Lipid Panel 8/8/22 1/23/23   Total Cholesterol 212 (A) 191   Triglycerides 323 (A) 191 (A)   HDL Cholesterol 36 (A) 46   VLDL Cholesterol 57 (A) 33   LDL Cholesterol  119 (A) 112 (A)   LDL/HDL Ratio 3.09 2.32   (A) Abnormal value            TSH    TSH 8/8/22 1/23/23   TSH 3.550 3.880               Lab Results   Component Value Date    SIDQ13AC 42.0 01/23/2023     Lab Results   Component Value Date    FREET4 1.03 01/23/2023     Lab Results   Component Value Date    CIGMJASC53 629 12/28/2021     Lab Results   Component Value Date    RBCUA None Seen 01/23/2023    WBCUA 0-2 (A) 01/23/2023    BACTERIA None Seen 01/23/2023    SQUAMEPIUA 0-2 01/23/2023    METHODOLOGY Manual Light Microscopy 01/23/2023    COLORU Yellow 01/23/2023    CLARITYU Clear 01/23/2023    PHUR 5.5 01/23/2023    SPECGRAVUR 1.025 01/23/2023    GLUCOSEU Negative 01/23/2023    KETONESU Negative 01/23/2023    BILIRUBINUR Negative 01/23/2023    BLOODU Trace (A) 01/23/2023    PROTEINUA Negative 01/23/2023    LEUKOCYTESUR Small (1+) (A) 01/23/2023    NITRITEU Negative 01/23/2023    UROBILINOGEN 0.2 E.U./dL 01/23/2023       No Images in the past 120 days found..     The above data has been reviewed by JIM Lino 01/27/2023 10:55 EST.          Patient Care Team:  Shanta Hannah APRN as PCP - General (Internal Medicine)           ASSESSMENT & PLAN    Diagnoses and all  orders for this visit:    1. Vitamin D deficiency (Primary)  Assessment & Plan:  Stable with supplementation.  Continue supplementation    Orders:  -     Vitamin D,25-Hydroxy; Future    2. Vitamin B 12 deficiency  -     Vitamin B12 & Folate; Future    3. Mixed hyperlipidemia  Assessment & Plan:  Much improved.  Patient is doing well with pravastatin 40 mg daily.  Continue pravastatin 40 mg daily.    Orders:  -     CBC & Differential; Future  -     Comprehensive Metabolic Panel; Future  -     Lipid Panel; Future  -     Urinalysis With Microscopic - Urine, Clean Catch; Future    4. Multiple sclerosis (HCC)  Assessment & Plan:  History of multiple sclerosis.  She also has chronic pain.  She is taking gabapentin and baclofen.  Patient also has Parafon.  She has clonazepam, small dose, at bedtime.  She states she has noticed that sometimes her pain feels foggy.    She does also admit to some anxiety.  She states not bad.  She is wondering if it could be related to her medications.  Patient will back on gabapentin to once a day.  She will let me know if anxiety or symptoms persist.         Tobacco Use: Medium Risk   • Smoking Tobacco Use: Former   • Smokeless Tobacco Use: Never   • Passive Exposure: Not on file       Follow Up     Return in about 6 months (around 7/27/2023).    Please note that portions of this note were completed with a voice recognition program.    Patient was given instructions and counseling regarding her condition or for health maintenance advice. Please see specific information pulled into the AVS if appropriate.   I have reviewed information obtained and documented by others and I have confirmed the accuracy of this documented note.    JIM Lino

## 2023-01-27 NOTE — ASSESSMENT & PLAN NOTE
History of multiple sclerosis.  She also has chronic pain.  She is taking gabapentin and baclofen.  Patient also has Parafon.  She has clonazepam, small dose, at bedtime.  She states she has noticed that sometimes her pain feels foggy.    She does also admit to some anxiety.  She states not bad.  She is wondering if it could be related to her medications.  Patient will back on gabapentin to once a day.  She will let me know if anxiety or symptoms persist.

## 2023-01-27 NOTE — ASSESSMENT & PLAN NOTE
Much improved.  Patient is doing well with pravastatin 40 mg daily.  Continue pravastatin 40 mg daily.

## 2023-01-31 DIAGNOSIS — G35 MULTIPLE SCLEROSIS: ICD-10-CM

## 2023-01-31 RX ORDER — GABAPENTIN 300 MG/1
300 CAPSULE ORAL 2 TIMES DAILY
Qty: 180 CAPSULE | Refills: 0 | Status: SHIPPED | OUTPATIENT
Start: 2023-01-31

## 2023-02-01 RX ORDER — PRAVASTATIN SODIUM 40 MG
40 TABLET ORAL DAILY
Qty: 90 TABLET | Refills: 1 | Status: SHIPPED | OUTPATIENT
Start: 2023-02-01

## 2023-02-16 ENCOUNTER — TELEPHONE (OUTPATIENT)
Dept: INTERNAL MEDICINE | Facility: CLINIC | Age: 68
End: 2023-02-16
Payer: MEDICARE

## 2023-02-17 ENCOUNTER — HOSPITAL ENCOUNTER (OUTPATIENT)
Dept: CT IMAGING | Facility: HOSPITAL | Age: 68
Discharge: HOME | End: 2023-02-17
Payer: MEDICARE

## 2023-02-17 ENCOUNTER — OFFICE VISIT (OUTPATIENT)
Dept: INTERNAL MEDICINE | Facility: CLINIC | Age: 68
End: 2023-02-17
Payer: MEDICARE

## 2023-02-17 VITALS
HEIGHT: 67 IN | TEMPERATURE: 96.9 F | BODY MASS INDEX: 30.26 KG/M2 | HEART RATE: 75 BPM | SYSTOLIC BLOOD PRESSURE: 120 MMHG | RESPIRATION RATE: 18 BRPM | DIASTOLIC BLOOD PRESSURE: 82 MMHG | WEIGHT: 192.8 LBS | OXYGEN SATURATION: 97 %

## 2023-02-17 DIAGNOSIS — R07.1 CHEST PAIN VARYING WITH BREATHING: Primary | ICD-10-CM

## 2023-02-17 DIAGNOSIS — R10.11 RUQ PAIN: ICD-10-CM

## 2023-02-17 DIAGNOSIS — R07.81 PLEURITIC PAIN: ICD-10-CM

## 2023-02-17 DIAGNOSIS — R07.1 CHEST PAIN VARYING WITH BREATHING: ICD-10-CM

## 2023-02-17 LAB
ALBUMIN SERPL-MCNC: 4.6 G/DL (ref 3.5–5.2)
ALBUMIN/GLOB SERPL: 1.8 G/DL
ALP SERPL-CCNC: 67 U/L (ref 39–117)
ALT SERPL W P-5'-P-CCNC: 51 U/L (ref 1–33)
AMYLASE SERPL-CCNC: 67 U/L (ref 28–100)
ANION GAP SERPL CALCULATED.3IONS-SCNC: 9 MMOL/L (ref 5–15)
AST SERPL-CCNC: 32 U/L (ref 1–32)
BASOPHILS # BLD AUTO: 0.03 10*3/MM3 (ref 0–0.2)
BASOPHILS NFR BLD AUTO: 0.5 % (ref 0–1.5)
BILIRUB SERPL-MCNC: 0.3 MG/DL (ref 0–1.2)
BUN SERPL-MCNC: 12 MG/DL (ref 8–23)
BUN/CREAT SERPL: 12.2 (ref 7–25)
CALCIUM SPEC-SCNC: 9.9 MG/DL (ref 8.6–10.5)
CHLORIDE SERPL-SCNC: 104 MMOL/L (ref 98–107)
CO2 SERPL-SCNC: 28 MMOL/L (ref 22–29)
CREAT SERPL-MCNC: 0.98 MG/DL (ref 0.57–1)
DEPRECATED RDW RBC AUTO: 40.8 FL (ref 37–54)
EGFRCR SERPLBLD CKD-EPI 2021: 63.8 ML/MIN/1.73
EOSINOPHIL # BLD AUTO: 0.2 10*3/MM3 (ref 0–0.4)
EOSINOPHIL NFR BLD AUTO: 3.3 % (ref 0.3–6.2)
ERYTHROCYTE [DISTWIDTH] IN BLOOD BY AUTOMATED COUNT: 11.9 % (ref 12.3–15.4)
GLOBULIN UR ELPH-MCNC: 2.6 GM/DL
GLUCOSE SERPL-MCNC: 100 MG/DL (ref 65–99)
HCT VFR BLD AUTO: 42.3 % (ref 34–46.6)
HGB BLD-MCNC: 14.2 G/DL (ref 12–15.9)
IMM GRANULOCYTES # BLD AUTO: 0.01 10*3/MM3 (ref 0–0.05)
IMM GRANULOCYTES NFR BLD AUTO: 0.2 % (ref 0–0.5)
LIPASE SERPL-CCNC: 25 U/L (ref 13–60)
LYMPHOCYTES # BLD AUTO: 2.4 10*3/MM3 (ref 0.7–3.1)
LYMPHOCYTES NFR BLD AUTO: 39.9 % (ref 19.6–45.3)
MCH RBC QN AUTO: 31.5 PG (ref 26.6–33)
MCHC RBC AUTO-ENTMCNC: 33.6 G/DL (ref 31.5–35.7)
MCV RBC AUTO: 93.8 FL (ref 79–97)
MONOCYTES # BLD AUTO: 0.53 10*3/MM3 (ref 0.1–0.9)
MONOCYTES NFR BLD AUTO: 8.8 % (ref 5–12)
NEUTROPHILS NFR BLD AUTO: 2.84 10*3/MM3 (ref 1.7–7)
NEUTROPHILS NFR BLD AUTO: 47.3 % (ref 42.7–76)
NRBC BLD AUTO-RTO: 0 /100 WBC (ref 0–0.2)
PLATELET # BLD AUTO: 244 10*3/MM3 (ref 140–450)
PMV BLD AUTO: 9.7 FL (ref 6–12)
POTASSIUM SERPL-SCNC: 4.3 MMOL/L (ref 3.5–5.2)
PROT SERPL-MCNC: 7.2 G/DL (ref 6–8.5)
RBC # BLD AUTO: 4.51 10*6/MM3 (ref 3.77–5.28)
SODIUM SERPL-SCNC: 141 MMOL/L (ref 136–145)
WBC NRBC COR # BLD: 6.01 10*3/MM3 (ref 3.4–10.8)

## 2023-02-17 PROCEDURE — 99214 OFFICE O/P EST MOD 30 MIN: CPT

## 2023-02-17 PROCEDURE — 36415 COLL VENOUS BLD VENIPUNCTURE: CPT

## 2023-02-17 PROCEDURE — 0 IOPAMIDOL PER 1 ML

## 2023-02-17 PROCEDURE — 85025 COMPLETE CBC W/AUTO DIFF WBC: CPT

## 2023-02-17 PROCEDURE — 71260 CT THORAX DX C+: CPT

## 2023-02-17 PROCEDURE — 82150 ASSAY OF AMYLASE: CPT

## 2023-02-17 PROCEDURE — 80053 COMPREHEN METABOLIC PANEL: CPT

## 2023-02-17 PROCEDURE — 83690 ASSAY OF LIPASE: CPT

## 2023-02-17 RX ADMIN — IOPAMIDOL 100 ML: 755 INJECTION, SOLUTION INTRAVENOUS at 19:32

## 2023-02-17 NOTE — ASSESSMENT & PLAN NOTE
complaints of right chest wall pain with deep breathing. Patient that's that she thought she strained a muscle but the pain persisted.   She does feel a little short of breath. Admits to worsening cough as well. Pain radiates to her back.  Denies fever, reflux, chills, nausea, vomiting, diarrhea.  This has been present for about a week.   Started spontaneously.  Denies any recent extended travel.   o2 sat is 97%.   No pain with palpation..  Considered acute pancreatitis, cholecystitis, PE, spontaneous pneumothorax.  Will get stat CT chest r/o PE and pneumothorax. Labs also ordered.   Pt to go to ED with any worsening symptoms.

## 2023-02-17 NOTE — PROGRESS NOTES
Chief Complaint  Abdominal Pain (68 y/o female is here today c/o RUQ abdominal pain x 1 week, only when taking a deep breath. Patient states she hasn't take any medications for pain. )    History of Present Illness  SUBJECTIVE  Tania Payne presents to [facility] with complaints of ruq pain with deep breathing. Patient that's that she thought she strained a muscle but the pain persisted.   She does feel a little short of breath. Admits to worsening cough as well. Pain radiates to her back.  Denies fever, chills, nausea, vomiting, diarrhea.  This has been present for about a week. Started spontaneously.  Denies any recent extended travel.   She did recently increase her pravastatin.  She has not taken anything for the discomfort.     Past Medical History:   Diagnosis Date   • Anemia    • Headache    • Hyperlipidemia    • Multiple sclerosis (HCC)    • Neuromuscular disorder (HCC) 2017    Multiple Sclerosis      Family History   Problem Relation Age of Onset   • Pancreatitis Mother    • Heart disease Father       Past Surgical History:   Procedure Laterality Date   • BREAST SURGERY  1998    Biopsy   • COLONOSCOPY     • KNEE ARTHROSCOPY Right 2018   • SHOULDER ARTHROSCOPY Right 1998        Current Outpatient Medications:   •  chlorzoxazone (PARAFON FORTE) 500 MG tablet, Take 1/2 tablet by mouth in the afternoon and take 1 tablet by mouth at bedtime, Disp: 180 tablet, Rfl: 0  •  cholecalciferol (VITAMIN D3) 25 MCG (1000 UT) tablet, Take 2,000 Units by mouth Daily., Disp: , Rfl:   •  clonazePAM (KlonoPIN) 0.125 MG disintegrating tablet, Place 1 tablet on the tongue and dissolve daily., Disp: 30 tablet, Rfl: 0  •  gabapentin (NEURONTIN) 300 MG capsule, Take 1 capsule by mouth 2 (Two) Times a Day., Disp: 180 capsule, Rfl: 0  •  multivitamin with minerals tablet tablet, Take 1 tablet by mouth Daily., Disp: , Rfl:   •  pravastatin (PRAVACHOL) 40 MG tablet, Take 1 tablet by mouth Daily.,  "Disp: 90 tablet, Rfl: 1  •  vitamin B-12 (CYANOCOBALAMIN) 1000 MCG tablet, Take 1,000 mcg by mouth Daily., Disp: , Rfl:     OBJECTIVE  Vital Signs:   /82 (BP Location: Left arm, Patient Position: Sitting, Cuff Size: Adult)   Pulse 75   Temp 96.9 °F (36.1 °C) (Temporal)   Resp 18   Ht 170.2 cm (67.01\")   Wt 87.5 kg (192 lb 12.8 oz)   SpO2 97%   BMI 30.19 kg/m²    Estimated body mass index is 30.19 kg/m² as calculated from the following:    Height as of this encounter: 170.2 cm (67.01\").    Weight as of this encounter: 87.5 kg (192 lb 12.8 oz).     Wt Readings from Last 3 Encounters:   02/17/23 87.5 kg (192 lb 12.8 oz)   01/27/23 86.6 kg (191 lb)   11/08/22 84.8 kg (187 lb)     BP Readings from Last 3 Encounters:   02/17/23 120/82   01/27/23 109/73   11/08/22 131/86       Physical Exam  Vitals and nursing note reviewed.   Constitutional:       Appearance: Normal appearance.   HENT:      Head: Normocephalic and atraumatic.   Eyes:      Extraocular Movements: Extraocular movements intact.      Conjunctiva/sclera: Conjunctivae normal.   Cardiovascular:      Rate and Rhythm: Normal rate and regular rhythm.      Heart sounds: Normal heart sounds.   Pulmonary:      Effort: Pulmonary effort is normal. No respiratory distress.      Breath sounds: Normal breath sounds. No stridor. No wheezing, rhonchi or rales.   Chest:      Chest wall: No tenderness.          Comments: Pain radiates to the back. No pain with paplation  Abdominal:      General: Abdomen is flat. Bowel sounds are normal. There is no distension.      Palpations: Abdomen is soft. There is no mass.      Tenderness: There is no abdominal tenderness. There is no right CVA tenderness, left CVA tenderness, guarding or rebound.      Hernia: No hernia is present.   Musculoskeletal:         General: Normal range of motion.      Cervical back: Normal range of motion.   Skin:     General: Skin is warm and dry.   Neurological:      General: No focal deficit " present.      Mental Status: She is alert and oriented to person, place, and time. Mental status is at baseline.   Psychiatric:         Mood and Affect: Mood normal.         Behavior: Behavior normal.         Thought Content: Thought content normal.         Judgment: Judgment normal.          Result Review        No Images in the past 120 days found..     The above data has been reviewed by JIM Lino 02/17/2023 10:12 EST.          Patient Care Team:  Shanta Hannah APRN as PCP - General (Internal Medicine)      ASSESSMENT & PLAN    Diagnoses and all orders for this visit:    1. Chest pain varying with breathing (Primary)  Assessment & Plan:  complaints of right chest wall pain with deep breathing. Patient that's that she thought she strained a muscle but the pain persisted.   She does feel a little short of breath. Admits to worsening cough as well. Pain radiates to her back.  Denies fever, reflux, chills, nausea, vomiting, diarrhea.  This has been present for about a week.   Started spontaneously.  Denies any recent extended travel.   o2 sat is 97%.   No pain with palpation..  Considered acute pancreatitis, cholecystitis, PE, spontaneous pneumothorax.  Will get stat CT chest r/o PE and pneumothorax. Labs also ordered.   Pt to go to ED with any worsening symptoms.    Orders:  -     CT Angiogram Chest; Future  -     CBC & Differential; Future  -     Amylase; Future  -     Lipase; Future  -     Comprehensive metabolic panel; Future  -     CBC & Differential  -     Amylase  -     Lipase  -     Comprehensive metabolic panel    2. RUQ pain  -     CT Angiogram Chest; Future  -     CBC & Differential; Future  -     Amylase; Future  -     Lipase; Future  -     Comprehensive metabolic panel; Future  -     CBC & Differential  -     Amylase  -     Lipase  -     Comprehensive metabolic panel    3. Pleuritic pain       Tobacco Use: Medium Risk   • Smoking Tobacco Use: Former   • Smokeless Tobacco Use: Never   • Passive  Exposure: Not on file       Follow Up     No follow-ups on file.    Please note that portions of this note were completed with a voice recognition program.    Patient was given instructions and counseling regarding her condition or for health maintenance advice. Please see specific information pulled into the AVS if appropriate.   I have reviewed information obtained and documented by others and I have confirmed the accuracy of this documented note.    JIM Lino

## 2023-03-08 DIAGNOSIS — G35 MULTIPLE SCLEROSIS: ICD-10-CM

## 2023-03-08 DIAGNOSIS — R56.9 FOCAL SEIZURES: ICD-10-CM

## 2023-03-09 RX ORDER — CHLORZOXAZONE 500 MG/1
500 TABLET ORAL
Qty: 180 TABLET | Refills: 0 | Status: SHIPPED | OUTPATIENT
Start: 2023-03-09

## 2023-03-09 RX ORDER — CLONAZEPAM 0.12 MG/1
0.12 TABLET, ORALLY DISINTEGRATING ORAL DAILY
Qty: 30 TABLET | Refills: 0 | Status: SHIPPED | OUTPATIENT
Start: 2023-03-09

## 2023-05-16 DIAGNOSIS — G35 MULTIPLE SCLEROSIS: ICD-10-CM

## 2023-05-16 RX ORDER — GABAPENTIN 300 MG/1
300 CAPSULE ORAL 2 TIMES DAILY
Qty: 180 CAPSULE | Refills: 0 | Status: SHIPPED | OUTPATIENT
Start: 2023-05-16

## 2023-05-31 DIAGNOSIS — R56.9 FOCAL SEIZURES: ICD-10-CM

## 2023-06-01 RX ORDER — CLONAZEPAM 0.12 MG/1
0.12 TABLET, ORALLY DISINTEGRATING ORAL DAILY
Qty: 30 TABLET | Refills: 1 | Status: SHIPPED | OUTPATIENT
Start: 2023-06-01

## 2023-07-24 ENCOUNTER — LAB (OUTPATIENT)
Dept: LAB | Facility: HOSPITAL | Age: 68
End: 2023-07-24
Payer: MEDICARE

## 2023-07-24 DIAGNOSIS — E55.9 VITAMIN D DEFICIENCY: ICD-10-CM

## 2023-07-24 DIAGNOSIS — E78.2 MIXED HYPERLIPIDEMIA: ICD-10-CM

## 2023-07-24 DIAGNOSIS — E53.8 VITAMIN B 12 DEFICIENCY: ICD-10-CM

## 2023-07-24 LAB
25(OH)D3 SERPL-MCNC: 42.8 NG/ML (ref 30–100)
ALBUMIN SERPL-MCNC: 4.6 G/DL (ref 3.5–5.2)
ALBUMIN/GLOB SERPL: 1.8 G/DL
ALP SERPL-CCNC: 69 U/L (ref 39–117)
ALT SERPL W P-5'-P-CCNC: 32 U/L (ref 1–33)
ANION GAP SERPL CALCULATED.3IONS-SCNC: 11.9 MMOL/L (ref 5–15)
AST SERPL-CCNC: 29 U/L (ref 1–32)
BACTERIA UR QL AUTO: ABNORMAL /HPF
BASOPHILS # BLD AUTO: 0.02 10*3/MM3 (ref 0–0.2)
BASOPHILS NFR BLD AUTO: 0.3 % (ref 0–1.5)
BILIRUB SERPL-MCNC: 0.3 MG/DL (ref 0–1.2)
BILIRUB UR QL STRIP: NEGATIVE
BUN SERPL-MCNC: 12 MG/DL (ref 8–23)
BUN/CREAT SERPL: 11.3 (ref 7–25)
CALCIUM SPEC-SCNC: 9.6 MG/DL (ref 8.6–10.5)
CHLORIDE SERPL-SCNC: 107 MMOL/L (ref 98–107)
CHOLEST SERPL-MCNC: 217 MG/DL (ref 0–200)
CLARITY UR: CLEAR
CO2 SERPL-SCNC: 23.1 MMOL/L (ref 22–29)
COLOR UR: YELLOW
CREAT SERPL-MCNC: 1.06 MG/DL (ref 0.57–1)
DEPRECATED RDW RBC AUTO: 44.5 FL (ref 37–54)
EGFRCR SERPLBLD CKD-EPI 2021: 58.1 ML/MIN/1.73
EOSINOPHIL # BLD AUTO: 0.38 10*3/MM3 (ref 0–0.4)
EOSINOPHIL NFR BLD AUTO: 6.6 % (ref 0.3–6.2)
ERYTHROCYTE [DISTWIDTH] IN BLOOD BY AUTOMATED COUNT: 12.6 % (ref 12.3–15.4)
FOLATE SERPL-MCNC: >20 NG/ML (ref 4.78–24.2)
GLOBULIN UR ELPH-MCNC: 2.5 GM/DL
GLUCOSE SERPL-MCNC: 97 MG/DL (ref 65–99)
GLUCOSE UR STRIP-MCNC: NEGATIVE MG/DL
HCT VFR BLD AUTO: 43.6 % (ref 34–46.6)
HDLC SERPL-MCNC: 39 MG/DL (ref 40–60)
HGB BLD-MCNC: 14.6 G/DL (ref 12–15.9)
HGB UR QL STRIP.AUTO: ABNORMAL
IMM GRANULOCYTES # BLD AUTO: 0.01 10*3/MM3 (ref 0–0.05)
IMM GRANULOCYTES NFR BLD AUTO: 0.2 % (ref 0–0.5)
KETONES UR QL STRIP: NEGATIVE
LDLC SERPL CALC-MCNC: 135 MG/DL (ref 0–100)
LDLC/HDLC SERPL: 3.34 {RATIO}
LEUKOCYTE ESTERASE UR QL STRIP.AUTO: ABNORMAL
LYMPHOCYTES # BLD AUTO: 2.27 10*3/MM3 (ref 0.7–3.1)
LYMPHOCYTES NFR BLD AUTO: 39.2 % (ref 19.6–45.3)
MCH RBC QN AUTO: 31.9 PG (ref 26.6–33)
MCHC RBC AUTO-ENTMCNC: 33.5 G/DL (ref 31.5–35.7)
MCV RBC AUTO: 95.2 FL (ref 79–97)
MONOCYTES # BLD AUTO: 0.42 10*3/MM3 (ref 0.1–0.9)
MONOCYTES NFR BLD AUTO: 7.3 % (ref 5–12)
NEUTROPHILS NFR BLD AUTO: 2.69 10*3/MM3 (ref 1.7–7)
NEUTROPHILS NFR BLD AUTO: 46.4 % (ref 42.7–76)
NITRITE UR QL STRIP: NEGATIVE
PH UR STRIP.AUTO: 5.5 [PH] (ref 5–8)
PLATELET # BLD AUTO: 223 10*3/MM3 (ref 140–450)
PMV BLD AUTO: 8.9 FL (ref 6–12)
POTASSIUM SERPL-SCNC: 4.5 MMOL/L (ref 3.5–5.2)
PROT SERPL-MCNC: 7.1 G/DL (ref 6–8.5)
PROT UR QL STRIP: NEGATIVE
RBC # BLD AUTO: 4.58 10*6/MM3 (ref 3.77–5.28)
RBC # UR STRIP: ABNORMAL /HPF
REF LAB TEST METHOD: ABNORMAL
SODIUM SERPL-SCNC: 142 MMOL/L (ref 136–145)
SP GR UR STRIP: >=1.03 (ref 1–1.03)
SQUAMOUS #/AREA URNS HPF: ABNORMAL /HPF
TRIGL SERPL-MCNC: 238 MG/DL (ref 0–150)
UROBILINOGEN UR QL STRIP: ABNORMAL
VIT B12 BLD-MCNC: 430 PG/ML (ref 211–946)
VLDLC SERPL-MCNC: 43 MG/DL (ref 5–40)
WBC # UR STRIP: ABNORMAL /HPF
WBC NRBC COR # BLD: 5.79 10*3/MM3 (ref 3.4–10.8)

## 2023-07-24 PROCEDURE — 36415 COLL VENOUS BLD VENIPUNCTURE: CPT

## 2023-07-24 PROCEDURE — 82746 ASSAY OF FOLIC ACID SERUM: CPT

## 2023-07-24 PROCEDURE — 81001 URINALYSIS AUTO W/SCOPE: CPT

## 2023-07-24 PROCEDURE — 85025 COMPLETE CBC W/AUTO DIFF WBC: CPT

## 2023-07-24 PROCEDURE — 80061 LIPID PANEL: CPT

## 2023-07-24 PROCEDURE — 82607 VITAMIN B-12: CPT

## 2023-07-24 PROCEDURE — 80053 COMPREHEN METABOLIC PANEL: CPT

## 2023-07-24 PROCEDURE — 82306 VITAMIN D 25 HYDROXY: CPT

## 2023-07-27 ENCOUNTER — TELEPHONE (OUTPATIENT)
Dept: INTERNAL MEDICINE | Facility: CLINIC | Age: 68
End: 2023-07-27
Payer: MEDICARE

## 2023-07-27 DIAGNOSIS — R53.83 FATIGUE, UNSPECIFIED TYPE: ICD-10-CM

## 2023-07-27 DIAGNOSIS — D50.9 IRON DEFICIENCY ANEMIA, UNSPECIFIED IRON DEFICIENCY ANEMIA TYPE: ICD-10-CM

## 2023-07-27 DIAGNOSIS — E78.2 MIXED HYPERLIPIDEMIA: Primary | ICD-10-CM

## 2023-07-27 NOTE — PROGRESS NOTES
Thanks-we will monitor her labs. Recommend healthier diet, increase activity. Please make sure she has follow up next time her  comes in.

## 2023-07-28 RX ORDER — PRAVASTATIN SODIUM 40 MG
40 TABLET ORAL DAILY
Qty: 90 TABLET | Refills: 1 | Status: SHIPPED | OUTPATIENT
Start: 2023-07-28

## 2023-08-28 DIAGNOSIS — G35 MULTIPLE SCLEROSIS: ICD-10-CM

## 2023-08-28 DIAGNOSIS — R56.9 FOCAL SEIZURES: ICD-10-CM

## 2023-08-28 RX ORDER — CLONAZEPAM 0.12 MG/1
0.12 TABLET, ORALLY DISINTEGRATING ORAL DAILY
Qty: 30 TABLET | Refills: 1 | Status: SHIPPED | OUTPATIENT
Start: 2023-08-28

## 2023-08-28 RX ORDER — GABAPENTIN 300 MG/1
300 CAPSULE ORAL 2 TIMES DAILY
Qty: 180 CAPSULE | Refills: 0 | Status: SHIPPED | OUTPATIENT
Start: 2023-08-28

## 2023-11-02 ENCOUNTER — LAB (OUTPATIENT)
Dept: LAB | Facility: HOSPITAL | Age: 68
End: 2023-11-02
Payer: MEDICARE

## 2023-11-02 DIAGNOSIS — D50.9 IRON DEFICIENCY ANEMIA, UNSPECIFIED IRON DEFICIENCY ANEMIA TYPE: ICD-10-CM

## 2023-11-02 DIAGNOSIS — R53.83 FATIGUE, UNSPECIFIED TYPE: ICD-10-CM

## 2023-11-02 DIAGNOSIS — Z11.59 NEED FOR HEPATITIS C SCREENING TEST: ICD-10-CM

## 2023-11-02 DIAGNOSIS — E78.2 MIXED HYPERLIPIDEMIA: ICD-10-CM

## 2023-11-02 LAB
ALBUMIN SERPL-MCNC: 4.3 G/DL (ref 3.5–5.2)
ALBUMIN/GLOB SERPL: 1.5 G/DL
ALP SERPL-CCNC: 73 U/L (ref 39–117)
ALT SERPL W P-5'-P-CCNC: 36 U/L (ref 1–33)
ANION GAP SERPL CALCULATED.3IONS-SCNC: 7.8 MMOL/L (ref 5–15)
AST SERPL-CCNC: 29 U/L (ref 1–32)
BASOPHILS # BLD AUTO: 0.02 10*3/MM3 (ref 0–0.2)
BASOPHILS NFR BLD AUTO: 0.4 % (ref 0–1.5)
BILIRUB SERPL-MCNC: 0.3 MG/DL (ref 0–1.2)
BUN SERPL-MCNC: 12 MG/DL (ref 8–23)
BUN/CREAT SERPL: 12.8 (ref 7–25)
CALCIUM SPEC-SCNC: 9.6 MG/DL (ref 8.6–10.5)
CHLORIDE SERPL-SCNC: 106 MMOL/L (ref 98–107)
CHOLEST SERPL-MCNC: 209 MG/DL (ref 0–200)
CO2 SERPL-SCNC: 27.2 MMOL/L (ref 22–29)
CREAT SERPL-MCNC: 0.94 MG/DL (ref 0.57–1)
DEPRECATED RDW RBC AUTO: 44.4 FL (ref 37–54)
EGFRCR SERPLBLD CKD-EPI 2021: 66.6 ML/MIN/1.73
EOSINOPHIL # BLD AUTO: 0.22 10*3/MM3 (ref 0–0.4)
EOSINOPHIL NFR BLD AUTO: 4.2 % (ref 0.3–6.2)
ERYTHROCYTE [DISTWIDTH] IN BLOOD BY AUTOMATED COUNT: 12.4 % (ref 12.3–15.4)
GLOBULIN UR ELPH-MCNC: 2.8 GM/DL
GLUCOSE SERPL-MCNC: 99 MG/DL (ref 65–99)
HCT VFR BLD AUTO: 44.2 % (ref 34–46.6)
HCV AB SER DONR QL: NORMAL
HDLC SERPL-MCNC: 37 MG/DL (ref 40–60)
HGB BLD-MCNC: 14.2 G/DL (ref 12–15.9)
IMM GRANULOCYTES # BLD AUTO: 0.01 10*3/MM3 (ref 0–0.05)
IMM GRANULOCYTES NFR BLD AUTO: 0.2 % (ref 0–0.5)
LDLC SERPL CALC-MCNC: 120 MG/DL (ref 0–100)
LDLC/HDLC SERPL: 3.04 {RATIO}
LYMPHOCYTES # BLD AUTO: 1.94 10*3/MM3 (ref 0.7–3.1)
LYMPHOCYTES NFR BLD AUTO: 37.5 % (ref 19.6–45.3)
MCH RBC QN AUTO: 30.9 PG (ref 26.6–33)
MCHC RBC AUTO-ENTMCNC: 32.1 G/DL (ref 31.5–35.7)
MCV RBC AUTO: 96.1 FL (ref 79–97)
MONOCYTES # BLD AUTO: 0.46 10*3/MM3 (ref 0.1–0.9)
MONOCYTES NFR BLD AUTO: 8.9 % (ref 5–12)
NEUTROPHILS NFR BLD AUTO: 2.53 10*3/MM3 (ref 1.7–7)
NEUTROPHILS NFR BLD AUTO: 48.8 % (ref 42.7–76)
PLATELET # BLD AUTO: 213 10*3/MM3 (ref 140–450)
PMV BLD AUTO: 8.6 FL (ref 6–12)
POTASSIUM SERPL-SCNC: 4.4 MMOL/L (ref 3.5–5.2)
PROT SERPL-MCNC: 7.1 G/DL (ref 6–8.5)
RBC # BLD AUTO: 4.6 10*6/MM3 (ref 3.77–5.28)
SODIUM SERPL-SCNC: 141 MMOL/L (ref 136–145)
TRIGL SERPL-MCNC: 297 MG/DL (ref 0–150)
VLDLC SERPL-MCNC: 52 MG/DL (ref 5–40)
WBC NRBC COR # BLD: 5.18 10*3/MM3 (ref 3.4–10.8)

## 2023-11-02 PROCEDURE — 80061 LIPID PANEL: CPT

## 2023-11-02 PROCEDURE — 80053 COMPREHEN METABOLIC PANEL: CPT

## 2023-11-02 PROCEDURE — 85025 COMPLETE CBC W/AUTO DIFF WBC: CPT

## 2023-11-02 PROCEDURE — 86803 HEPATITIS C AB TEST: CPT

## 2023-11-02 PROCEDURE — 36415 COLL VENOUS BLD VENIPUNCTURE: CPT

## 2023-11-08 DIAGNOSIS — G35 MULTIPLE SCLEROSIS: ICD-10-CM

## 2023-11-09 RX ORDER — GABAPENTIN 300 MG/1
300 CAPSULE ORAL 2 TIMES DAILY
Qty: 180 CAPSULE | Refills: 0 | Status: SHIPPED | OUTPATIENT
Start: 2023-11-09

## 2023-11-13 ENCOUNTER — OFFICE VISIT (OUTPATIENT)
Dept: INTERNAL MEDICINE | Facility: CLINIC | Age: 68
End: 2023-11-13
Payer: MEDICARE

## 2023-11-13 VITALS
SYSTOLIC BLOOD PRESSURE: 120 MMHG | HEART RATE: 89 BPM | HEIGHT: 67 IN | BODY MASS INDEX: 29.98 KG/M2 | DIASTOLIC BLOOD PRESSURE: 74 MMHG | TEMPERATURE: 98 F | OXYGEN SATURATION: 97 % | WEIGHT: 191 LBS

## 2023-11-13 DIAGNOSIS — E55.9 VITAMIN D DEFICIENCY: ICD-10-CM

## 2023-11-13 DIAGNOSIS — G35 MULTIPLE SCLEROSIS: ICD-10-CM

## 2023-11-13 DIAGNOSIS — E53.8 VITAMIN B 12 DEFICIENCY: ICD-10-CM

## 2023-11-13 DIAGNOSIS — E78.2 MIXED HYPERLIPIDEMIA: Primary | ICD-10-CM

## 2023-11-13 PROCEDURE — 1160F RVW MEDS BY RX/DR IN RCRD: CPT

## 2023-11-13 PROCEDURE — 99214 OFFICE O/P EST MOD 30 MIN: CPT

## 2023-11-13 PROCEDURE — 1159F MED LIST DOCD IN RCRD: CPT

## 2023-11-13 RX ORDER — FENOFIBRATE 145 MG/1
145 TABLET, COATED ORAL DAILY
Qty: 90 TABLET | Refills: 1 | Status: SHIPPED | OUTPATIENT
Start: 2023-11-13

## 2023-11-13 RX ORDER — BACLOFEN 10 MG/1
10 TABLET ORAL 3 TIMES DAILY PRN
Qty: 90 TABLET | Refills: 1 | Status: SHIPPED | OUTPATIENT
Start: 2023-11-13

## 2023-11-13 RX ORDER — PRAVASTATIN SODIUM 80 MG/1
80 TABLET ORAL DAILY
Qty: 90 TABLET | Status: CANCELLED | OUTPATIENT
Start: 2023-11-13

## 2023-11-13 NOTE — PROGRESS NOTES
Chief Complaint  Labs Only (Follow up on lipids )    History of Present Illness  SUBJECTIVE  Tania Payne presents to [facility] follow-up on chronic disease management.    She is doing well overall.    She denies any current issues or complaints.     She would like to discuss her labs.      Past Medical History:   Diagnosis Date    Anemia     Headache     Hyperlipidemia     Multiple sclerosis     Neuromuscular disorder 2017    Multiple Sclerosis      Family History   Problem Relation Age of Onset    Pancreatitis Mother     Heart disease Father       Past Surgical History:   Procedure Laterality Date    BREAST SURGERY  1998    Biopsy    COLONOSCOPY      KNEE ARTHROSCOPY Right 2018    SHOULDER ARTHROSCOPY Right 1998        Current Outpatient Medications:     amoxicillin (AMOXIL) 500 MG capsule, Take 1 capsule by mouth 3 times a day until finished., Disp: 30 capsule, Rfl: 0    cholecalciferol (VITAMIN D3) 25 MCG (1000 UT) tablet, Take 2 tablets by mouth Daily., Disp: , Rfl:     clonazePAM (KlonoPIN) 0.125 MG disintegrating tablet, Place 1 tablet on the tongue and dissolve daily., Disp: 30 tablet, Rfl: 1    gabapentin (NEURONTIN) 300 MG capsule, Take 1 capsule by mouth 2 (Two) Times a Day., Disp: 180 capsule, Rfl: 0    multivitamin with minerals tablet tablet, Take 1 tablet by mouth Daily., Disp: , Rfl:     pravastatin (PRAVACHOL) 40 MG tablet, Take 1 tablet by mouth Daily., Disp: 90 tablet, Rfl: 1    baclofen (LIORESAL) 10 MG tablet, Take 1 tablet by mouth 3 (Three) Times a Day As Needed for Muscle Spasms., Disp: 90 tablet, Rfl: 1    fenofibrate (Tricor) 145 MG tablet, Take 1 tablet by mouth Daily., Disp: 90 tablet, Rfl: 1    vitamin B-12 (CYANOCOBALAMIN) 1000 MCG tablet, Take 1,000 mcg by mouth Daily. (Patient not taking: Reported on 11/13/2023), Disp: , Rfl:     OBJECTIVE  Vital Signs:   /74 (BP Location: Left arm, Patient Position: Sitting, Cuff Size: Small Adult)    "Pulse 89   Temp 98 °F (36.7 °C) (Temporal)   Ht 170.2 cm (67.01\")   Wt 86.6 kg (191 lb)   SpO2 97%   BMI 29.91 kg/m²    Estimated body mass index is 29.91 kg/m² as calculated from the following:    Height as of this encounter: 170.2 cm (67.01\").    Weight as of this encounter: 86.6 kg (191 lb).     Wt Readings from Last 3 Encounters:   11/13/23 86.6 kg (191 lb)   02/17/23 87.5 kg (192 lb 12.8 oz)   01/27/23 86.6 kg (191 lb)     BP Readings from Last 3 Encounters:   11/13/23 120/74   02/17/23 120/82   01/27/23 109/73       Physical Exam  Vitals and nursing note reviewed.   Constitutional:       Appearance: Normal appearance.   HENT:      Head: Normocephalic.   Eyes:      Extraocular Movements: Extraocular movements intact.      Conjunctiva/sclera: Conjunctivae normal.   Cardiovascular:      Rate and Rhythm: Normal rate and regular rhythm.      Heart sounds: Normal heart sounds. No murmur heard.  Pulmonary:      Effort: Pulmonary effort is normal.      Breath sounds: Normal breath sounds. No wheezing or rales.   Abdominal:      General: Bowel sounds are normal.      Palpations: Abdomen is soft.   Musculoskeletal:         General: No swelling. Normal range of motion.      Cervical back: Normal range of motion and neck supple.   Skin:     General: Skin is warm and dry.   Neurological:      General: No focal deficit present.      Mental Status: She is alert and oriented to person, place, and time. Mental status is at baseline.   Psychiatric:         Mood and Affect: Mood normal.         Behavior: Behavior normal.         Thought Content: Thought content normal.         Judgment: Judgment normal.          Result Review    CMP          2/17/2023    11:07 7/24/2023    08:39 11/2/2023    08:09   CMP   Glucose 100  97  99    BUN 12  12  12    Creatinine 0.98  1.06  0.94    EGFR 63.8  58.1  66.6    Sodium 141  142  141    Potassium 4.3  4.5  4.4    Chloride 104  107  106    Calcium 9.9  9.6  9.6    Total Protein 7.2  7.1  " 7.1    Albumin 4.6  4.6  4.3    Globulin 2.6  2.5  2.8    Total Bilirubin 0.3  0.3  0.3    Alkaline Phosphatase 67  69  73    AST (SGOT) 32  29  29    ALT (SGPT) 51  32  36    Albumin/Globulin Ratio 1.8  1.8  1.5    BUN/Creatinine Ratio 12.2  11.3  12.8    Anion Gap 9.0  11.9  7.8      CBC w/diff          2/17/2023    11:07 7/24/2023    08:39 11/2/2023    08:09   CBC w/Diff   WBC 6.01  5.79  5.18    RBC 4.51  4.58  4.60    Hemoglobin 14.2  14.6  14.2    Hematocrit 42.3  43.6  44.2    MCV 93.8  95.2  96.1    MCH 31.5  31.9  30.9    MCHC 33.6  33.5  32.1    RDW 11.9  12.6  12.4    Platelets 244  223  213    Neutrophil Rel % 47.3  46.4  48.8    Immature Granulocyte Rel % 0.2  0.2  0.2    Lymphocyte Rel % 39.9  39.2  37.5    Monocyte Rel % 8.8  7.3  8.9    Eosinophil Rel % 3.3  6.6  4.2    Basophil Rel % 0.5  0.3  0.4      Lipid Panel          1/23/2023    09:11 7/24/2023    08:39 11/2/2023    08:09   Lipid Panel   Total Cholesterol 191  217  209    Triglycerides 191  238  297    HDL Cholesterol 46  39  37    VLDL Cholesterol 33  43  52    LDL Cholesterol  112  135  120    LDL/HDL Ratio 2.32  3.34  3.04      TSH          1/23/2023    09:11   TSH   TSH 3.880        No Images in the past 120 days found..     The above data has been reviewed by JIM Lino 11/13/2023 10:50 EST.          Patient Care Team:  Shanta Hannah APRN as PCP - General (Internal Medicine)    BMI is >= 30 and <35. (Class 1 Obesity). The following options were offered after discussion;: exercise counseling/recommendations and nutrition counseling/recommendations       ASSESSMENT & PLAN    Diagnoses and all orders for this visit:    1. Mixed hyperlipidemia (Primary)  Assessment & Plan:  Lipids are elevated.  She is currently on pravastatin and tolerating well.   Triglycerides are elevated.  Will add in fenofibrate.  Follow up with labs in 3 months.    Orders:  -     fenofibrate (Tricor) 145 MG tablet; Take 1 tablet by mouth Daily.  Dispense: 90  tablet; Refill: 1  -     CBC & Differential; Future  -     Comprehensive Metabolic Panel; Future  -     Lipid Panel; Future  -     TSH Rfx On Abnormal To Free T4; Future    2. Vitamin D deficiency  -     Vitamin D,25-Hydroxy; Future    3. Vitamin B 12 deficiency  -     Vitamin B12 & Folate; Future    4. Multiple sclerosis  Assessment & Plan:  Patient with a history of diagnosis of multiple sclerosis.  He is also seen neurologist to disagree with the diagnosis.  Symptomatically stable at this time.  She does have chronic pain.  She does take gabapentin and clonazepam.  She also has chlorzoxazone that she takes..  She states that she would like to get off that and maybe try baclofen again.  We will transition her over to baclofen.  Continue gabapentin and clonazepam.  Follow-up with neuro.    Orders:  -     baclofen (LIORESAL) 10 MG tablet; Take 1 tablet by mouth 3 (Three) Times a Day As Needed for Muscle Spasms.  Dispense: 90 tablet; Refill: 1         Tobacco Use: Medium Risk (11/13/2023)    Patient History     Smoking Tobacco Use: Former     Smokeless Tobacco Use: Never     Passive Exposure: Not on file       Follow Up     Return in about 3 months (around 2/13/2024) for Medicare Wellness.    Please note that portions of this note were completed with a voice recognition program.    Patient was given instructions and counseling regarding her condition or for health maintenance advice. Please see specific information pulled into the AVS if appropriate.   I have reviewed information obtained and documented by others and I have confirmed the accuracy of this documented note.    JIM Lino

## 2023-11-13 NOTE — ASSESSMENT & PLAN NOTE
Lipids are elevated.  She is currently on pravastatin and tolerating well.   Triglycerides are elevated.  Will add in fenofibrate.  Follow up with labs in 3 months.

## 2023-11-13 NOTE — ASSESSMENT & PLAN NOTE
Patient with a history of diagnosis of multiple sclerosis.  He is also seen neurologist to disagree with the diagnosis.  Symptomatically stable at this time.  She does have chronic pain.  She does take gabapentin and clonazepam.  She also has chlorzoxazone that she takes..  She states that she would like to get off that and maybe try baclofen again.  We will transition her over to baclofen.  Continue gabapentin and clonazepam.  Follow-up with neuro.

## 2023-12-15 ENCOUNTER — OFFICE VISIT (OUTPATIENT)
Dept: INTERNAL MEDICINE | Facility: CLINIC | Age: 68
End: 2023-12-15
Payer: MEDICARE

## 2023-12-15 VITALS
OXYGEN SATURATION: 98 % | BODY MASS INDEX: 30.13 KG/M2 | HEIGHT: 67 IN | WEIGHT: 192 LBS | RESPIRATION RATE: 18 BRPM | DIASTOLIC BLOOD PRESSURE: 64 MMHG | TEMPERATURE: 98.4 F | SYSTOLIC BLOOD PRESSURE: 116 MMHG | HEART RATE: 64 BPM

## 2023-12-15 DIAGNOSIS — Z78.0 POSTMENOPAUSAL: ICD-10-CM

## 2023-12-15 DIAGNOSIS — H93.13 TINNITUS OF BOTH EARS: ICD-10-CM

## 2023-12-15 DIAGNOSIS — R73.01 IMPAIRED FASTING GLUCOSE: ICD-10-CM

## 2023-12-15 DIAGNOSIS — H91.93 DECREASED HEARING OF BOTH EARS: ICD-10-CM

## 2023-12-15 DIAGNOSIS — Z12.31 ENCOUNTER FOR SCREENING MAMMOGRAM FOR MALIGNANT NEOPLASM OF BREAST: ICD-10-CM

## 2023-12-15 DIAGNOSIS — Z00.00 ENCOUNTER FOR SUBSEQUENT ANNUAL WELLNESS VISIT (AWV) IN MEDICARE PATIENT: Primary | ICD-10-CM

## 2023-12-15 DIAGNOSIS — Z23 NEEDS FLU SHOT: ICD-10-CM

## 2023-12-15 DIAGNOSIS — L30.9 DERMATITIS: ICD-10-CM

## 2023-12-15 RX ORDER — CLOBETASOL PROPIONATE 0.5 MG/G
CREAM TOPICAL 2 TIMES DAILY
Qty: 30 G | Refills: 1 | Status: SHIPPED | OUTPATIENT
Start: 2023-12-15 | End: 2024-01-01

## 2024-01-09 DIAGNOSIS — R56.9 FOCAL SEIZURES: ICD-10-CM

## 2024-01-11 RX ORDER — CLONAZEPAM 0.12 MG/1
0.12 TABLET, ORALLY DISINTEGRATING ORAL DAILY
Qty: 30 TABLET | Refills: 1 | Status: SHIPPED | OUTPATIENT
Start: 2024-01-11

## 2024-01-23 RX ORDER — PRAVASTATIN SODIUM 40 MG
40 TABLET ORAL DAILY
Qty: 90 TABLET | Refills: 1 | Status: SHIPPED | OUTPATIENT
Start: 2024-01-23

## 2024-03-19 ENCOUNTER — LAB (OUTPATIENT)
Dept: LAB | Facility: HOSPITAL | Age: 69
End: 2024-03-19
Payer: MEDICARE

## 2024-03-19 DIAGNOSIS — E53.8 VITAMIN B 12 DEFICIENCY: ICD-10-CM

## 2024-03-19 DIAGNOSIS — E78.2 MIXED HYPERLIPIDEMIA: ICD-10-CM

## 2024-03-19 DIAGNOSIS — E55.9 VITAMIN D DEFICIENCY: ICD-10-CM

## 2024-03-19 LAB
25(OH)D3 SERPL-MCNC: 31.8 NG/ML (ref 30–100)
ALBUMIN SERPL-MCNC: 4.4 G/DL (ref 3.5–5.2)
ALBUMIN/GLOB SERPL: 1.7 G/DL
ALP SERPL-CCNC: 58 U/L (ref 39–117)
ALT SERPL W P-5'-P-CCNC: 40 U/L (ref 1–33)
ANION GAP SERPL CALCULATED.3IONS-SCNC: 9 MMOL/L (ref 5–15)
AST SERPL-CCNC: 31 U/L (ref 1–32)
BASOPHILS # BLD AUTO: 0.01 10*3/MM3 (ref 0–0.2)
BASOPHILS NFR BLD AUTO: 0.2 % (ref 0–1.5)
BILIRUB SERPL-MCNC: 0.3 MG/DL (ref 0–1.2)
BUN SERPL-MCNC: 15 MG/DL (ref 8–23)
BUN/CREAT SERPL: 14 (ref 7–25)
CALCIUM SPEC-SCNC: 9.4 MG/DL (ref 8.6–10.5)
CHLORIDE SERPL-SCNC: 108 MMOL/L (ref 98–107)
CHOLEST SERPL-MCNC: 152 MG/DL (ref 0–200)
CO2 SERPL-SCNC: 25 MMOL/L (ref 22–29)
CREAT SERPL-MCNC: 1.07 MG/DL (ref 0.57–1)
DEPRECATED RDW RBC AUTO: 44.4 FL (ref 37–54)
EGFRCR SERPLBLD CKD-EPI 2021: 57 ML/MIN/1.73
EOSINOPHIL # BLD AUTO: 0.21 10*3/MM3 (ref 0–0.4)
EOSINOPHIL NFR BLD AUTO: 4.2 % (ref 0.3–6.2)
ERYTHROCYTE [DISTWIDTH] IN BLOOD BY AUTOMATED COUNT: 12.5 % (ref 12.3–15.4)
FOLATE SERPL-MCNC: >20 NG/ML (ref 4.78–24.2)
GLOBULIN UR ELPH-MCNC: 2.6 GM/DL
GLUCOSE SERPL-MCNC: 92 MG/DL (ref 65–99)
HCT VFR BLD AUTO: 41.8 % (ref 34–46.6)
HDLC SERPL-MCNC: 41 MG/DL (ref 40–60)
HGB BLD-MCNC: 13.7 G/DL (ref 12–15.9)
IMM GRANULOCYTES # BLD AUTO: 0 10*3/MM3 (ref 0–0.05)
IMM GRANULOCYTES NFR BLD AUTO: 0 % (ref 0–0.5)
LDLC SERPL CALC-MCNC: 88 MG/DL (ref 0–100)
LDLC/HDLC SERPL: 2.09 {RATIO}
LYMPHOCYTES # BLD AUTO: 2.19 10*3/MM3 (ref 0.7–3.1)
LYMPHOCYTES NFR BLD AUTO: 43.5 % (ref 19.6–45.3)
MCH RBC QN AUTO: 31 PG (ref 26.6–33)
MCHC RBC AUTO-ENTMCNC: 32.8 G/DL (ref 31.5–35.7)
MCV RBC AUTO: 94.6 FL (ref 79–97)
MONOCYTES # BLD AUTO: 0.39 10*3/MM3 (ref 0.1–0.9)
MONOCYTES NFR BLD AUTO: 7.8 % (ref 5–12)
NEUTROPHILS NFR BLD AUTO: 2.23 10*3/MM3 (ref 1.7–7)
NEUTROPHILS NFR BLD AUTO: 44.3 % (ref 42.7–76)
PLATELET # BLD AUTO: 255 10*3/MM3 (ref 140–450)
PMV BLD AUTO: 8.9 FL (ref 6–12)
POTASSIUM SERPL-SCNC: 4.4 MMOL/L (ref 3.5–5.2)
PROT SERPL-MCNC: 7 G/DL (ref 6–8.5)
RBC # BLD AUTO: 4.42 10*6/MM3 (ref 3.77–5.28)
SODIUM SERPL-SCNC: 142 MMOL/L (ref 136–145)
TRIGL SERPL-MCNC: 126 MG/DL (ref 0–150)
TSH SERPL DL<=0.05 MIU/L-ACNC: 3.33 UIU/ML (ref 0.27–4.2)
VIT B12 BLD-MCNC: 692 PG/ML (ref 211–946)
VLDLC SERPL-MCNC: 23 MG/DL (ref 5–40)
WBC NRBC COR # BLD AUTO: 5.03 10*3/MM3 (ref 3.4–10.8)

## 2024-03-19 PROCEDURE — 80053 COMPREHEN METABOLIC PANEL: CPT

## 2024-03-19 PROCEDURE — 82306 VITAMIN D 25 HYDROXY: CPT

## 2024-03-19 PROCEDURE — 82607 VITAMIN B-12: CPT

## 2024-03-19 PROCEDURE — 82746 ASSAY OF FOLIC ACID SERUM: CPT

## 2024-03-19 PROCEDURE — 36415 COLL VENOUS BLD VENIPUNCTURE: CPT

## 2024-03-19 PROCEDURE — 85025 COMPLETE CBC W/AUTO DIFF WBC: CPT

## 2024-03-19 PROCEDURE — 84443 ASSAY THYROID STIM HORMONE: CPT

## 2024-03-19 PROCEDURE — 80061 LIPID PANEL: CPT

## 2024-03-22 DIAGNOSIS — N28.9 RENAL INSUFFICIENCY: Primary | ICD-10-CM

## 2024-04-01 ENCOUNTER — PATIENT MESSAGE (OUTPATIENT)
Dept: INTERNAL MEDICINE | Age: 69
End: 2024-04-01
Payer: MEDICARE

## 2024-04-01 RX ORDER — CHLORZOXAZONE 500 MG/1
500 TABLET ORAL 3 TIMES DAILY PRN
Qty: 90 TABLET | Refills: 1 | Status: SHIPPED | OUTPATIENT
Start: 2024-04-01

## 2024-04-01 NOTE — TELEPHONE ENCOUNTER
From: Tania Payne  To: Shanta Hannah  Sent: 4/1/2024 12:54 AM EDT  Subject: chlorzoxazone    I stopped the chlorzoxazone 500mg tablets in January to try and only use the baclofen to help with muscle spasms. Unfortunately, the baclofen is not managing the spasms and pain. If possible, I would like to stop the baclofen and resume the chlorzoxazone. Please ask Shanta if this is possible. Thanks.

## 2024-04-08 DIAGNOSIS — G35 MULTIPLE SCLEROSIS: ICD-10-CM

## 2024-04-08 RX ORDER — GABAPENTIN 300 MG/1
300 CAPSULE ORAL 2 TIMES DAILY
Qty: 180 CAPSULE | Refills: 0 | Status: CANCELLED | OUTPATIENT
Start: 2024-04-08

## 2024-04-21 DIAGNOSIS — R56.9 FOCAL SEIZURES: ICD-10-CM

## 2024-04-21 DIAGNOSIS — G35 MULTIPLE SCLEROSIS: ICD-10-CM

## 2024-04-22 RX ORDER — CLONAZEPAM 0.12 MG/1
0.12 TABLET, ORALLY DISINTEGRATING ORAL DAILY
Qty: 30 TABLET | Refills: 1 | Status: SHIPPED | OUTPATIENT
Start: 2024-04-22

## 2024-04-22 RX ORDER — GABAPENTIN 300 MG/1
300 CAPSULE ORAL 2 TIMES DAILY
Qty: 180 CAPSULE | Refills: 0 | Status: SHIPPED | OUTPATIENT
Start: 2024-04-22

## 2024-06-11 ENCOUNTER — TELEPHONE (OUTPATIENT)
Dept: INTERNAL MEDICINE | Age: 69
End: 2024-06-11

## 2024-06-11 NOTE — TELEPHONE ENCOUNTER
Caller: Tania Payne    Relationship: Self    Best call back number: [phone]    What was the call regarding: PATIENT HAS MOVED AWAY AND ESTABLISHING CARE WITH NEW PRIMARY CARE PROVIDER. SHE WOULD LIKE TO HAVE ALL ORDERS CANCELLED.